# Patient Record
Sex: FEMALE | Race: BLACK OR AFRICAN AMERICAN | NOT HISPANIC OR LATINO | Employment: UNEMPLOYED | URBAN - METROPOLITAN AREA
[De-identification: names, ages, dates, MRNs, and addresses within clinical notes are randomized per-mention and may not be internally consistent; named-entity substitution may affect disease eponyms.]

---

## 2023-04-27 ENCOUNTER — APPOINTMENT (OUTPATIENT)
Dept: PRIMARY CARE | Facility: CLINIC | Age: 53
End: 2023-04-27
Payer: COMMERCIAL

## 2023-06-14 ENCOUNTER — OFFICE VISIT (OUTPATIENT)
Dept: PRIMARY CARE | Facility: CLINIC | Age: 53
End: 2023-06-14
Payer: COMMERCIAL

## 2023-06-14 VITALS
WEIGHT: 233.8 LBS | TEMPERATURE: 97.1 F | HEART RATE: 78 BPM | OXYGEN SATURATION: 100 % | RESPIRATION RATE: 18 BRPM | SYSTOLIC BLOOD PRESSURE: 112 MMHG | HEIGHT: 66 IN | BODY MASS INDEX: 37.57 KG/M2 | DIASTOLIC BLOOD PRESSURE: 82 MMHG

## 2023-06-14 DIAGNOSIS — I10 HTN (HYPERTENSION), BENIGN: ICD-10-CM

## 2023-06-14 DIAGNOSIS — Z00.00 HEALTH MAINTENANCE EXAMINATION: Primary | ICD-10-CM

## 2023-06-14 DIAGNOSIS — N95.0 POST-MENOPAUSAL BLEEDING: ICD-10-CM

## 2023-06-14 DIAGNOSIS — R73.09 ABNORMAL BLOOD SUGAR: ICD-10-CM

## 2023-06-14 DIAGNOSIS — Z12.31 ENCOUNTER FOR SCREENING MAMMOGRAM FOR MALIGNANT NEOPLASM OF BREAST: ICD-10-CM

## 2023-06-14 DIAGNOSIS — Z86.010 HISTORY OF COLON POLYPS: ICD-10-CM

## 2023-06-14 PROBLEM — M51.379 DEGENERATIVE DISC DISEASE AT L5-S1 LEVEL: Status: ACTIVE | Noted: 2023-06-14

## 2023-06-14 PROBLEM — M54.12 CERVICAL RADICULOPATHY: Status: ACTIVE | Noted: 2023-06-14

## 2023-06-14 PROBLEM — M51.37 DEGENERATIVE DISC DISEASE AT L5-S1 LEVEL: Status: ACTIVE | Noted: 2023-06-14

## 2023-06-14 PROBLEM — Z86.0100 HISTORY OF COLON POLYPS: Status: ACTIVE | Noted: 2023-06-14

## 2023-06-14 PROBLEM — N95.1 HOT FLASH, MENOPAUSAL: Status: ACTIVE | Noted: 2023-06-14

## 2023-06-14 LAB
ALANINE AMINOTRANSFERASE (SGPT) (U/L) IN SER/PLAS: 12 U/L (ref 7–45)
ALBUMIN (G/DL) IN SER/PLAS: 4.3 G/DL (ref 3.4–5)
ALKALINE PHOSPHATASE (U/L) IN SER/PLAS: 103 U/L (ref 33–110)
ANION GAP IN SER/PLAS: 12 MMOL/L (ref 10–20)
ASPARTATE AMINOTRANSFERASE (SGOT) (U/L) IN SER/PLAS: 16 U/L (ref 9–39)
BILIRUBIN TOTAL (MG/DL) IN SER/PLAS: 1.2 MG/DL (ref 0–1.2)
CALCIUM (MG/DL) IN SER/PLAS: 10.1 MG/DL (ref 8.6–10.6)
CARBON DIOXIDE, TOTAL (MMOL/L) IN SER/PLAS: 32 MMOL/L (ref 21–32)
CHLORIDE (MMOL/L) IN SER/PLAS: 100 MMOL/L (ref 98–107)
CHOLESTEROL (MG/DL) IN SER/PLAS: 211 MG/DL (ref 0–199)
CHOLESTEROL IN HDL (MG/DL) IN SER/PLAS: 59.9 MG/DL
CHOLESTEROL/HDL RATIO: 3.5
CREATININE (MG/DL) IN SER/PLAS: 0.87 MG/DL (ref 0.5–1.05)
ERYTHROCYTE DISTRIBUTION WIDTH (RATIO) BY AUTOMATED COUNT: 15.1 % (ref 11.5–14.5)
ERYTHROCYTE MEAN CORPUSCULAR HEMOGLOBIN CONCENTRATION (G/DL) BY AUTOMATED: 30.3 G/DL (ref 32–36)
ERYTHROCYTE MEAN CORPUSCULAR VOLUME (FL) BY AUTOMATED COUNT: 91 FL (ref 80–100)
ERYTHROCYTES (10*6/UL) IN BLOOD BY AUTOMATED COUNT: 4.45 X10E12/L (ref 4–5.2)
GFR FEMALE: 80 ML/MIN/1.73M2
GLUCOSE (MG/DL) IN SER/PLAS: 78 MG/DL (ref 74–99)
HEMATOCRIT (%) IN BLOOD BY AUTOMATED COUNT: 40.6 % (ref 36–46)
HEMOGLOBIN (G/DL) IN BLOOD: 12.3 G/DL (ref 12–16)
LDL: 137 MG/DL (ref 0–99)
LEUKOCYTES (10*3/UL) IN BLOOD BY AUTOMATED COUNT: 5.3 X10E9/L (ref 4.4–11.3)
NRBC (PER 100 WBCS) BY AUTOMATED COUNT: 0 /100 WBC (ref 0–0)
PLATELETS (10*3/UL) IN BLOOD AUTOMATED COUNT: 302 X10E9/L (ref 150–450)
POTASSIUM (MMOL/L) IN SER/PLAS: 4.7 MMOL/L (ref 3.5–5.3)
PROTEIN TOTAL: 7.3 G/DL (ref 6.4–8.2)
SODIUM (MMOL/L) IN SER/PLAS: 139 MMOL/L (ref 136–145)
THYROTROPIN (MIU/L) IN SER/PLAS BY DETECTION LIMIT <= 0.05 MIU/L: 1.01 MIU/L (ref 0.44–3.98)
TRIGLYCERIDE (MG/DL) IN SER/PLAS: 69 MG/DL (ref 0–149)
UREA NITROGEN (MG/DL) IN SER/PLAS: 19 MG/DL (ref 6–23)
VLDL: 14 MG/DL (ref 0–40)

## 2023-06-14 PROCEDURE — 80053 COMPREHEN METABOLIC PANEL: CPT

## 2023-06-14 PROCEDURE — 3074F SYST BP LT 130 MM HG: CPT | Performed by: FAMILY MEDICINE

## 2023-06-14 PROCEDURE — 80061 LIPID PANEL: CPT

## 2023-06-14 PROCEDURE — 3079F DIAST BP 80-89 MM HG: CPT | Performed by: FAMILY MEDICINE

## 2023-06-14 PROCEDURE — 83036 HEMOGLOBIN GLYCOSYLATED A1C: CPT

## 2023-06-14 PROCEDURE — 85027 COMPLETE CBC AUTOMATED: CPT

## 2023-06-14 PROCEDURE — 84443 ASSAY THYROID STIM HORMONE: CPT

## 2023-06-14 PROCEDURE — 99396 PREV VISIT EST AGE 40-64: CPT | Performed by: FAMILY MEDICINE

## 2023-06-14 PROCEDURE — 1036F TOBACCO NON-USER: CPT | Performed by: FAMILY MEDICINE

## 2023-06-14 RX ORDER — SEMAGLUTIDE 1 MG/.5ML
INJECTION, SOLUTION SUBCUTANEOUS
COMMUNITY
Start: 2023-05-22

## 2023-06-14 RX ORDER — GABAPENTIN 300 MG/1
1 CAPSULE ORAL 3 TIMES DAILY
COMMUNITY
Start: 2023-05-09 | End: 2023-12-04

## 2023-06-14 RX ORDER — IBUPROFEN 800 MG/1
1 TABLET ORAL EVERY 8 HOURS PRN
COMMUNITY
Start: 2021-07-13 | End: 2023-09-01 | Stop reason: ALTCHOICE

## 2023-06-14 RX ORDER — OXYCODONE AND ACETAMINOPHEN 5; 325 MG/1; MG/1
1 TABLET ORAL EVERY 8 HOURS PRN
COMMUNITY
Start: 2022-12-19 | End: 2023-09-01 | Stop reason: ALTCHOICE

## 2023-06-14 RX ORDER — TRIAMCINOLONE ACETONIDE 0.25 MG/G
OINTMENT TOPICAL
COMMUNITY
Start: 2020-05-07

## 2023-06-14 RX ORDER — AMLODIPINE BESYLATE 5 MG/1
1 TABLET ORAL DAILY
COMMUNITY
Start: 2018-04-23 | End: 2023-06-14 | Stop reason: SDUPTHER

## 2023-06-14 RX ORDER — METHYLPREDNISOLONE 4 MG/1
TABLET ORAL
COMMUNITY
Start: 2023-02-08 | End: 2023-09-01 | Stop reason: ALTCHOICE

## 2023-06-14 RX ORDER — FLUOCINOLONE ACETONIDE 0.25 MG/G
OINTMENT TOPICAL 2 TIMES DAILY
COMMUNITY
Start: 2019-04-16

## 2023-06-14 RX ORDER — CYCLOBENZAPRINE HCL 10 MG
10 TABLET ORAL AS NEEDED
COMMUNITY

## 2023-06-14 RX ORDER — BIOTIN 5 MG
1 TABLET ORAL DAILY
COMMUNITY
Start: 2022-05-18

## 2023-06-14 RX ORDER — CELECOXIB 100 MG/1
CAPSULE ORAL
COMMUNITY
Start: 2014-01-24

## 2023-06-14 RX ORDER — CICLOPIROX OLAMINE 7.7 MG/100ML
SUSPENSION TOPICAL
COMMUNITY
Start: 2021-06-03

## 2023-06-14 RX ORDER — CLOBETASOL PROPIONATE 0.5 MG/G
OINTMENT TOPICAL
COMMUNITY
Start: 2022-01-04

## 2023-06-14 RX ORDER — AMLODIPINE BESYLATE 5 MG/1
5 TABLET ORAL DAILY
Qty: 90 TABLET | Refills: 3 | Status: SHIPPED | OUTPATIENT
Start: 2023-06-14

## 2023-06-14 RX ORDER — HYDROQUINONE 40 MG/G
CREAM TOPICAL
COMMUNITY
Start: 2021-06-03 | End: 2023-09-01 | Stop reason: ALTCHOICE

## 2023-06-14 RX ORDER — MULTIVITAMIN
TABLET ORAL
COMMUNITY
Start: 2022-05-18

## 2023-06-14 RX ORDER — METFORMIN HYDROCHLORIDE 500 MG/1
TABLET ORAL
COMMUNITY
Start: 2023-05-14

## 2023-06-14 RX ORDER — MECLIZINE HYDROCHLORIDE 25 MG/1
TABLET ORAL
COMMUNITY
Start: 2022-05-18

## 2023-06-14 NOTE — PROGRESS NOTES
Reason for Visit: Annual Physical Exam    HPI:  Presents for CPE.    Weight gain: seeing weight loss specialist online, on [employer], doing well, no adverse effects    GYN: Saw Dr. Ash 10/2022, rare ASCUS with neg HPV, does report she has not had a cycle in the last 2 years but every couple of months when she wipes she will have some blood, no pelvic pain or discomfort, occurs fairly regularly    Colonoscopy scheduled for August for previous abnormal    HTN: taking meds as prescribed, no issues or problems    Active Problem List  Patient Active Problem List   Diagnosis    Cervical radiculopathy    Degenerative disc disease at L5-S1 level    History of colon polyps    Hot flash, menopausal    Health maintenance examination    HTN (hypertension), benign    Post-menopausal bleeding       Comprehensive Medical/Surgical/Social/Family History  Past Medical History:   Diagnosis Date    Anesthesia of skin 03/10/2017    Numbness of lip    Body mass index (BMI)40.0-44.9, adult (CMS/[facility]) 05/26/2021    BMI 40.0-44.9, adult    Elevated blood-pressure reading, without diagnosis of hypertension 03/10/2017    Elevated BP without diagnosis of hypertension    Encounter for gynecological examination (general) (routine) without abnormal findings 08/29/2017    Well woman exam    Encounter for immunization 04/23/2018    Need for Tdap vaccination    Encounter for other screening for malignant neoplasm of breast 06/15/2018    Screening for breast cancer    Encounter for screening for infections with a predominantly sexual mode of transmission 06/30/2016    Screening for STD (sexually transmitted disease)    Encounter for screening for malignant neoplasm of cervix 08/29/2017    Screening for cervical cancer    Localized enlarged lymph nodes 04/30/2015    Enlarged lymph nodes in armpit    Localized enlarged lymph nodes 08/22/2014    Cervical adenopathy    Morbid (severe) obesity due to excess calories (CMS/HCC) 06/07/2022    Class 2 severe  obesity with serious comorbidity and body mass index (BMI) of 39.0 to 39.9 in adult    Other specified health status 2014    Contraception    Personal history of other diseases of the musculoskeletal system and connective tissue 2014    History of low back pain    Personal history of other specified conditions 2019    History of palpitations    Personal history of other specified conditions 2019    History of paresthesia    Personal history of other specified conditions 2014    History of headache     Past Surgical History:   Procedure Laterality Date     SECTION, CLASSIC  2014     Section     Social History     Tobacco Use    Smoking status: Never    Smokeless tobacco: Never   Substance Use Topics    Alcohol use: Never    Drug use: Never     No family history on file.      Allergies and Medications  Hydrocodone-acetaminophen and Hydrocodone-guaifenesin  Current Outpatient Medications on File Prior to Visit   Medication Sig Dispense Refill    biotin 5 mg tablet Take 1 tablet (5 mg) by mouth once daily.      CeleBREX 100 mg capsule Take by mouth.      ciclopirox (Loprox) 0.77 % suspension APPLY TO AFFECTED AREAS ON SCALP TWICE DAILY AS NEEDED. MAY DECREASE TO 3 TIMES A WEEK WHEN CLEAR.      clobetasol (Temovate) 0.05 % ointment APPLY TOPICALLY TO AFFECTED AREAS ON SCALP  BODY TWICE A DAY AS NEEDED  TWO WEEKS ON / ONE WEEK OFF. STOP WHEN CLEAR. AVOID FACE  AXILLAE  G      fluocinolone (Synalar) 0.025 % ointment Apply topically 2 times a day.      gabapentin (Neurontin) 300 mg capsule Take 1 capsule (300 mg) by mouth 3 times a day.      hydroquinone 4 % cream APPLY TO HYPERPIGMENTED AREAS TWICE A DAY UP TO 3 MONTHS MAX      ibuprofen 800 mg tablet Take 1 tablet (800 mg) by mouth every 8 hours if needed.      meclizine (Antivert) 25 mg tablet Take by mouth.      metFORMIN (Glucophage) 500 mg tablet       methylPREDNISolone (Medrol Dospak) 4 mg tablets Take by mouth.    "   multivitamin tablet Take by mouth.      oxyCODONE-acetaminophen (Percocet) 5-325 mg tablet Take 1 tablet by mouth every 8 hours if needed.      triamcinolone (Kenalog) 0.025 % ointment Triamcinolone Acetonide 0.025 % External Ointment  Refills: 0  Start: 7-May-2020      Wegovy 1 mg/0.5 mL pen injector PLEASE SEE ATTACHED FOR DETAILED DIRECTIONS      [DISCONTINUED] amLODIPine (Norvasc) 5 mg tablet Take 1 tablet (5 mg) by mouth once daily.      cyclobenzaprine (Flexeril) 10 mg tablet Take 1 tablet (10 mg) by mouth 3 times a day.       No current facility-administered medications on file prior to visit.         ROS otherwise negative aside from what was mentioned above in HPI.    Vitals  /82 (BP Location: Right arm, Patient Position: Sitting)   Pulse 78   Temp 36.2 °C (97.1 °F) (Temporal)   Resp 18   Ht 1.676 m (5' 5.98\")   Wt 106 kg (233 lb 12.8 oz)   SpO2 100%   BMI 37.75 kg/m²   Body mass index is 37.75 kg/m².  Physical Exam  Gen: Alert, NAD  HEENT:  PERRL, EOMI, conjunctiva and sclera normal in appearance. External auditory canals/TMs normal; Oral cavity and posterior pharynx without lesions/exudate  Neck:  Supple with FROM; No masses/nodes palpable; Thyroid nontender and without nodules; No CE  Respiratory:  Lungs CTAB  Cardiovascular:  Heart RRR. No M/R/G. Peripheral pulses equal bilaterally  Abdomen:  Soft, nontender, BS present throughout; No R/G/R; No HSM or masses palpated  Extremities:  FROM all extremities; Muscle strength grossly normal with good tone  Neuro:  CN II-XII intact; Reflexes 2+/2+; Gross motor and sensory intact  Skin:  No suspicious lesions present    Assessment and Plan:  Problem List Items Addressed This Visit       History of colon polyps    Current Assessment & Plan     Colonoscopy follow up scheduled         Health maintenance examination - Primary    Current Assessment & Plan     Recommended screening guidelines addressed and orders placed as indicated by age and chronic " conditions  Screening labs ordered, will call with results  PAP, breast with GYN  Colonoscopy scheduled  Continue to work on healthy lifestyle including well balanced diet, regular activity, limit alcohol, no tobacco products and safe sexual practices  Follow up annually           Relevant Orders    CBC    Comprehensive Metabolic Panel    Lipid Panel    HTN (hypertension), benign    Current Assessment & Plan     Well controlled  Continue current regimen         Relevant Medications    amLODIPine (Norvasc) 5 mg tablet    Post-menopausal bleeding    Current Assessment & Plan     Discussed importance of follow up  Will check labs including TSH  Recommend pelvic ultrasound to evaluate uterine lining  Follow up with GYN for possible endometrial biopsy along with follow up PAP smear         Relevant Orders    CBC    TSH with reflex to Free T4 if abnormal    Pelvic Ultrasound     Other Visit Diagnoses       Encounter for screening mammogram for malignant neoplasm of breast        Relevant Orders    BI mammo bilateral screening tomosynthesis    Abnormal blood sugar        Relevant Orders    Hemoglobin A1C              Sue Moyer MD

## 2023-06-14 NOTE — ASSESSMENT & PLAN NOTE
Recommended screening guidelines addressed and orders placed as indicated by age and chronic conditions  Screening labs ordered, will call with results  PAP, breast with GYN  Colonoscopy scheduled  Continue to work on healthy lifestyle including well balanced diet, regular activity, limit alcohol, no tobacco products and safe sexual practices  Follow up annually

## 2023-06-14 NOTE — ASSESSMENT & PLAN NOTE
Discussed importance of follow up  Will check labs including TSH  Recommend pelvic ultrasound to evaluate uterine lining  Follow up with GYN for possible endometrial biopsy along with follow up PAP smear

## 2023-06-15 LAB
ESTIMATED AVERAGE GLUCOSE FOR HBA1C: 111 MG/DL
HEMOGLOBIN A1C/HEMOGLOBIN TOTAL IN BLOOD: 5.5 %

## 2023-07-28 ENCOUNTER — TELEPHONE (OUTPATIENT)
Dept: PRIMARY CARE | Facility: CLINIC | Age: 53
End: 2023-07-28
Payer: COMMERCIAL

## 2023-08-01 DIAGNOSIS — R94.8 ABNORMAL PET SCAN OF RETROPERITONEUM: Primary | ICD-10-CM

## 2023-08-01 NOTE — PROGRESS NOTES
Called pt to discuss results of PET scan ordered by PM&R physician.  Shows evidence of increased hypermetabolic activity in lymph nodes of right retroperitoneal nodes.  Pt did discuss some results with ordering physician but was not aware of possible underlying cause which appears suspicious for lymphomatous process.  All questions answered to best of my ability.  Of note, pt did have recent endometrial biopsy done with Dr. Ash, results still pending.  She is feeling well otherwise.  Discussed that we will have appt with surgical oncology set up ASAP to get her in and get answers.

## 2023-08-14 ENCOUNTER — HOSPITAL ENCOUNTER (OUTPATIENT)
Dept: DATA CONVERSION | Facility: HOSPITAL | Age: 53
End: 2023-08-14
Attending: STUDENT IN AN ORGANIZED HEALTH CARE EDUCATION/TRAINING PROGRAM | Admitting: STUDENT IN AN ORGANIZED HEALTH CARE EDUCATION/TRAINING PROGRAM
Payer: COMMERCIAL

## 2023-08-14 DIAGNOSIS — R59.0 LOCALIZED ENLARGED LYMPH NODES: ICD-10-CM

## 2023-08-14 DIAGNOSIS — C81.95: ICD-10-CM

## 2023-08-14 LAB
ATRIAL RATE: 71 BPM
P AXIS: 39 DEGREES
P OFFSET: 194 MS
P ONSET: 136 MS
POCT GLUCOSE: 91 MG/DL (ref 74–99)
PR INTERVAL: 170 MS
Q ONSET: 221 MS
QRS COUNT: 12 BEATS
QRS DURATION: 86 MS
QT INTERVAL: 418 MS
QTC CALCULATION(BAZETT): 454 MS
QTC FREDERICIA: 442 MS
R AXIS: -4 DEGREES
T AXIS: 27 DEGREES
T OFFSET: 430 MS
VENTRICULAR RATE: 71 BPM

## 2023-08-15 ENCOUNTER — APPOINTMENT (OUTPATIENT)
Dept: PRIMARY CARE | Facility: CLINIC | Age: 53
End: 2023-08-15
Payer: COMMERCIAL

## 2023-08-18 LAB
CCOLL: NORMAL PER TUBE
CCOUN: 106.4 X10E9/L
COMPLETE PATHOLOGY REPORT: NORMAL
CONVERTED CLINICAL DIAGNOSIS-HISTORY: NORMAL
CONVERTED FINAL DIAGNOSIS: NORMAL
CONVERTED FINAL REPORT PDF LINK TO COPY AND PASTE: NORMAL
CONVERTED GROSS DESCRIPTION: NORMAL
FCTOR: NORMAL
FCTSO: NORMAL
FSITE: NORMAL
LANTB: NORMAL
LCD19: 21 % OF LYMPH
LCD4: 67 % OF LYMPH
LCD8: 8 % OF LYMPH
LGPD1: NORMAL
LGPNO: NORMAL
LNK: 1 % OF LYMPH
LPERC: 92 %
PV194: NORMAL
VIAB: NORMAL

## 2023-09-01 ENCOUNTER — OFFICE VISIT (OUTPATIENT)
Dept: PRIMARY CARE | Facility: CLINIC | Age: 53
End: 2023-09-01
Payer: COMMERCIAL

## 2023-09-01 VITALS
WEIGHT: 220.6 LBS | OXYGEN SATURATION: 98 % | DIASTOLIC BLOOD PRESSURE: 86 MMHG | HEART RATE: 91 BPM | TEMPERATURE: 98.7 F | BODY MASS INDEX: 35.62 KG/M2 | SYSTOLIC BLOOD PRESSURE: 134 MMHG

## 2023-09-01 DIAGNOSIS — F41.9 ANXIETY: Primary | ICD-10-CM

## 2023-09-01 PROCEDURE — 1036F TOBACCO NON-USER: CPT | Performed by: FAMILY MEDICINE

## 2023-09-01 PROCEDURE — 3079F DIAST BP 80-89 MM HG: CPT | Performed by: FAMILY MEDICINE

## 2023-09-01 PROCEDURE — 3075F SYST BP GE 130 - 139MM HG: CPT | Performed by: FAMILY MEDICINE

## 2023-09-01 PROCEDURE — 99213 OFFICE O/P EST LOW 20 MIN: CPT | Performed by: FAMILY MEDICINE

## 2023-09-01 RX ORDER — SERTRALINE HYDROCHLORIDE 50 MG/1
50 TABLET, FILM COATED ORAL DAILY
Qty: 30 TABLET | Refills: 5 | Status: SHIPPED | OUTPATIENT
Start: 2023-09-01 | End: 2024-02-28

## 2023-09-01 RX ORDER — ALPRAZOLAM 0.25 MG/1
0.25 TABLET ORAL 2 TIMES DAILY PRN
Qty: 30 TABLET | Refills: 0 | Status: SHIPPED | OUTPATIENT
Start: 2023-09-01 | End: 2023-09-16

## 2023-09-01 ASSESSMENT — PATIENT HEALTH QUESTIONNAIRE - PHQ9
SUM OF ALL RESPONSES TO PHQ9 QUESTIONS 1 AND 2: 3
1. LITTLE INTEREST OR PLEASURE IN DOING THINGS: NOT AT ALL
2. FEELING DOWN, DEPRESSED OR HOPELESS: NEARLY EVERY DAY

## 2023-09-01 NOTE — PROGRESS NOTES
Subjective   Patient ID: Alejandra Jimenes is a 53 y.o. female who presents for Anxiety (Wants to get on medication).    HPI   Presents for follow up from recent imaging as well as recent diagnosis of lymphoma.  She is following with [facility].  Will be starting chemotherapy later this month.  Presents today because she is struggling with recent diagnosis.  Has trouble sleeping, lots of anxiety.  Has a lot of support.  Seeing therapist.  Would like something to help with sleep and overall mood.    Review of Systems  Negative unless noted in HPI    Objective   /86 (BP Location: Left arm, Patient Position: Sitting) Comment: electronic reading  Pulse 91   Temp 37.1 °C (98.7 °F) (Oral)   Wt 100 kg (220 lb 9.6 oz)   SpO2 98%   BMI 35.62 kg/m²     Physical Exam  Constitutional:       Appearance: Normal appearance.   Neurological:      General: No focal deficit present.      Mental Status: She is alert.   Psychiatric:         Mood and Affect: Mood normal.      Comments: Appropriately tearful         Assessment/Plan   Problem List Items Addressed This Visit       Anxiety - Primary     Significant recent stressors  We discussed coping strategies as well as support symptoms at length  Has strong support, sees therapy already   Will use limited short term rx of Xanax, discussed medication and its use at length  Will also add Zoloft for daily use  Continue to reevaluate psychologic needs throughout the treatment process         Relevant Medications    sertraline (Zoloft) 50 mg tablet    ALPRAZolam (Xanax) 0.25 mg tablet

## 2023-09-02 PROBLEM — Z00.00 HEALTH MAINTENANCE EXAMINATION: Status: RESOLVED | Noted: 2023-06-14 | Resolved: 2023-09-02

## 2023-09-02 PROBLEM — F41.9 ANXIETY: Status: ACTIVE | Noted: 2023-09-02

## 2023-09-02 NOTE — ASSESSMENT & PLAN NOTE
Significant recent stressors  We discussed coping strategies as well as support symptoms at length  Has strong support, sees therapy already   Will use limited short term rx of Xanax, discussed medication and its use at length  Will also add Zoloft for daily use  Continue to reevaluate psychologic needs throughout the treatment process

## 2023-09-27 PROBLEM — C81.00: Status: ACTIVE | Noted: 2023-09-27

## 2023-09-30 DIAGNOSIS — C81.00 NODULAR LYMPHOCYTE PREDOMINANT HODGKIN LYMPHOMA, UNSPECIFIED BODY REGION (MULTI): Primary | ICD-10-CM

## 2023-09-30 RX ORDER — PROCHLORPERAZINE MALEATE 10 MG
10 TABLET ORAL EVERY 6 HOURS PRN
Qty: 30 TABLET | Refills: 3 | Status: SHIPPED | OUTPATIENT
Start: 2023-09-30 | End: 2023-11-29

## 2023-09-30 NOTE — H&P
History & Physical Reviewed:   Pregnant/Lactating:  ·  Are You Pregnant no   ·  Are You Currently Breastfeeding no     I have reviewed the History and Physical dated:  14-Aug-2023   History and Physical reviewed and relevant findings noted. Patient examined to review pertinent physical  findings.: No significant changes   Home Medications Reviewed: no changes noted   Allergies Reviewed: no changes noted       ERAS (Enhanced Recovery After Surgery):  ·  ERAS Patient: no     Consent:   COVID-19 Consent:  ·  COVID-19 Risk Consent Surgeon has reviewed key risks related to the risk of jose luis COVID-19 and if they contract COVID-19 what the risks are.       Electronic Signatures:  Ron Wild)  (Signed 14-Aug-2023 07:50)   Authored: History & Physical Reviewed, ERAS, Consent,  Note Completion      Last Updated: 14-Aug-2023 07:50 by Ron Wild)

## 2023-10-01 NOTE — OP NOTE
PROCEDURE DETAILS    Preoperative Diagnosis:  Right inguinal lymphadenopathy  Postoperative Diagnosis:  Right inguinal lymphadenopathy  Surgeon: Ron Wild  Resident/Fellow/Other Assistant: Henry Dean    Procedure:  1. RIGHT INGUINAL LYMPH NODE EXCISION     Anesthesia: No anesthesiologist associated with this case  Estimated Blood Loss: 5  Findings: Soft enlarged right inguinal lymph nodes  Specimens(s) Collected: yes,  Right inguinal node-half for permanent half for fresh lymphoma protocol         Operative Report:   Indications for surgery: The patient was noted to have right inguinal lymphadenopathy concerning for lymphoproliferative disorder.  After discussing the risk  and benefits of excisional lymph node biopsy for diagnosis she elected to proceed.    Details of procedure: The patient arrived [facility] on 8/14/2023 for the aforementioned procedure.  History of physical was performed,  consent was signed, questions answered, she was moved to the operating room.  A timeout was performed and she was induced under general anesthesia.  Her right groin was prepped and draped in the usual sterile fashion and a 4 cm incision was made over  the palpable lymph node.  This was circumferentially dissected free with clips and cautery.  This was cut in half and sent for permanent pathology as well as fresh lymphoma protocol.  The wound was irrigated for hemostasis which was adequate.  Roxana  was injected into the wound which was then closed using a figure-of-eight 2-0 Vicryl for the deep layer, interrupted deep dermal 3-0 Vicryl's, and a running subcuticular 4-0 Monocryl for the skin.  The wound was dressed with Dermabond.  The patient was  awoken having Toller procedure well be discharged home.                        Attestation:   Note Completion:  Attending Attestation I performed the procedure without a resident         Electronic Signatures:  Ron Wild)  (Signed 14-Aug-2023  08:49)   Authored: Post-Operative Note, Chart Review, Note Completion      Last Updated: 14-Aug-2023 08:49 by Ron Wild)

## 2023-10-06 DIAGNOSIS — C81.00 NODULAR LYMPHOCYTE PREDOMINANT HODGKIN LYMPHOMA, UNSPECIFIED BODY REGION (MULTI): Primary | ICD-10-CM

## 2023-10-06 RX ORDER — HEPARIN 100 UNIT/ML
500 SYRINGE INTRAVENOUS AS NEEDED
Status: CANCELLED | OUTPATIENT
Start: 2023-10-10

## 2023-10-06 RX ORDER — HEPARIN SODIUM,PORCINE/PF 10 UNIT/ML
50 SYRINGE (ML) INTRAVENOUS AS NEEDED
Status: CANCELLED | OUTPATIENT
Start: 2023-10-10

## 2023-10-06 RX ORDER — DOXORUBICIN HYDROCHLORIDE 2 MG/ML
50 INJECTION, SOLUTION INTRAVENOUS ONCE
Status: CANCELLED | OUTPATIENT
Start: 2023-10-10

## 2023-10-06 RX ORDER — PROCHLORPERAZINE MALEATE 10 MG
10 TABLET ORAL EVERY 6 HOURS PRN
Status: CANCELLED | OUTPATIENT
Start: 2023-10-10

## 2023-10-06 RX ORDER — DIPHENHYDRAMINE HYDROCHLORIDE 50 MG/ML
50 INJECTION INTRAMUSCULAR; INTRAVENOUS AS NEEDED
Status: CANCELLED | OUTPATIENT
Start: 2023-10-10

## 2023-10-06 RX ORDER — EPINEPHRINE 0.3 MG/.3ML
0.3 INJECTION SUBCUTANEOUS EVERY 5 MIN PRN
Status: CANCELLED | OUTPATIENT
Start: 2023-10-10

## 2023-10-06 RX ORDER — PROCHLORPERAZINE EDISYLATE 5 MG/ML
10 INJECTION INTRAMUSCULAR; INTRAVENOUS EVERY 6 HOURS PRN
Status: CANCELLED | OUTPATIENT
Start: 2023-10-10

## 2023-10-06 RX ORDER — DIPHENHYDRAMINE HCL 50 MG
50 CAPSULE ORAL ONCE
Status: CANCELLED | OUTPATIENT
Start: 2023-10-10

## 2023-10-06 RX ORDER — ACETAMINOPHEN 325 MG/1
650 TABLET ORAL ONCE
Status: CANCELLED | OUTPATIENT
Start: 2023-10-10

## 2023-10-06 RX ORDER — FAMOTIDINE 10 MG/ML
20 INJECTION INTRAVENOUS ONCE AS NEEDED
Status: CANCELLED | OUTPATIENT
Start: 2023-10-10

## 2023-10-06 RX ORDER — PALONOSETRON 0.05 MG/ML
0.25 INJECTION, SOLUTION INTRAVENOUS ONCE
Status: CANCELLED | OUTPATIENT
Start: 2023-10-10

## 2023-10-06 RX ORDER — ALBUTEROL SULFATE 0.83 MG/ML
3 SOLUTION RESPIRATORY (INHALATION) AS NEEDED
Status: CANCELLED | OUTPATIENT
Start: 2023-10-10

## 2023-10-07 PROBLEM — K21.9 ACID REFLUX: Status: ACTIVE | Noted: 2023-10-07

## 2023-10-07 PROBLEM — F07.81 POST-CONCUSSION SYNDROME: Status: ACTIVE | Noted: 2023-10-07
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
## 2023-10-07 PROBLEM — R59.0 LYMPHADENOPATHY, INGUINAL: Status: ACTIVE | Noted: 2023-10-07

## 2023-10-07 PROBLEM — R32 URINE INCONTINENCE: Status: ACTIVE | Noted: 2023-10-07

## 2023-10-07 PROBLEM — R59.0 RETROPERITONEAL LYMPHADENOPATHY: Status: ACTIVE | Noted: 2023-10-07

## 2023-10-07 PROBLEM — R63.5 WEIGHT GAIN: Status: ACTIVE | Noted: 2023-10-07

## 2023-10-07 PROBLEM — F41.8 DEPRESSION WITH ANXIETY: Status: ACTIVE | Noted: 2023-10-07
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
## 2023-10-07 PROBLEM — R59.0 CERVICAL ADENOPATHY: Status: ACTIVE | Noted: 2023-10-07

## 2023-10-07 PROBLEM — R93.89 ABNORMAL ULTRASOUND: Status: ACTIVE | Noted: 2023-10-07

## 2023-10-07 PROBLEM — M54.16 LUMBAR RADICULITIS: Status: ACTIVE | Noted: 2023-10-07

## 2023-10-07 PROBLEM — M54.40 LOW BACK PAIN WITH SCIATICA: Status: ACTIVE | Noted: 2023-10-07

## 2023-10-07 PROBLEM — R63.2 POLYPHAGIA: Status: ACTIVE | Noted: 2023-10-07

## 2023-10-07 PROBLEM — G44.309 POST-TRAUMATIC HEADACHE, NOT INTRACTABLE: Status: ACTIVE | Noted: 2023-10-07

## 2023-10-07 PROBLEM — R55 SYNCOPE: Status: ACTIVE | Noted: 2023-10-07

## 2023-10-07 PROBLEM — E88.810 ABDOMINAL OBESITY AND METABOLIC SYNDROME: Status: ACTIVE | Noted: 2023-10-07

## 2023-10-07 PROBLEM — R42 VERTIGO: Status: ACTIVE | Noted: 2023-10-07

## 2023-10-07 PROBLEM — E66.9 ABDOMINAL OBESITY AND METABOLIC SYNDROME: Status: ACTIVE | Noted: 2023-10-07

## 2023-10-07 PROBLEM — G56.03 CARPAL TUNNEL SYNDROME, BILATERAL: Status: ACTIVE | Noted: 2019-02-13

## 2023-10-07 PROBLEM — M47.816 LUMBAR SPONDYLOSIS: Status: ACTIVE | Noted: 2023-10-07

## 2023-10-07 RX ORDER — NYSTATIN 100000 [USP'U]/ML
4 SUSPENSION ORAL 4 TIMES DAILY
COMMUNITY
Start: 2023-09-25

## 2023-10-07 RX ORDER — ONDANSETRON HYDROCHLORIDE 8 MG/1
8 TABLET, FILM COATED ORAL 3 TIMES DAILY PRN
COMMUNITY
Start: 2023-09-26 | End: 2023-11-10 | Stop reason: SDUPTHER

## 2023-10-07 RX ORDER — PREDNISONE 50 MG/1
100 TABLET ORAL DAILY
COMMUNITY
Start: 2023-09-23

## 2023-10-07 RX ORDER — SEMAGLUTIDE 1.7 MG/.75ML
1.7 INJECTION, SOLUTION SUBCUTANEOUS
COMMUNITY
Start: 2023-09-20

## 2023-10-07 RX ORDER — POTASSIUM CHLORIDE 750 MG/1
10 TABLET, EXTENDED RELEASE ORAL DAILY
COMMUNITY
Start: 2023-09-26

## 2023-10-10 ENCOUNTER — INFUSION (OUTPATIENT)
Dept: HEMATOLOGY/ONCOLOGY | Facility: CLINIC | Age: 53
End: 2023-10-10
Payer: COMMERCIAL

## 2023-10-10 VITALS
HEART RATE: 94 BPM | WEIGHT: 210 LBS | TEMPERATURE: 97.2 F | OXYGEN SATURATION: 99 % | SYSTOLIC BLOOD PRESSURE: 126 MMHG | BODY MASS INDEX: 34.99 KG/M2 | HEIGHT: 65 IN | RESPIRATION RATE: 18 BRPM | DIASTOLIC BLOOD PRESSURE: 81 MMHG

## 2023-10-10 DIAGNOSIS — C81.00 NODULAR LYMPHOCYTE PREDOMINANT HODGKIN LYMPHOMA, UNSPECIFIED BODY REGION (MULTI): ICD-10-CM

## 2023-10-10 LAB
ALBUMIN SERPL BCP-MCNC: 4.2 G/DL (ref 3.4–5)
ALP SERPL-CCNC: 76 U/L (ref 33–110)
ALT SERPL W P-5'-P-CCNC: 18 U/L (ref 7–45)
ANION GAP SERPL CALC-SCNC: 14 MMOL/L (ref 10–20)
AST SERPL W P-5'-P-CCNC: 26 U/L (ref 9–39)
BASOPHILS # BLD AUTO: 0 X10*3/UL (ref 0–0.1)
BASOPHILS NFR BLD AUTO: 0 %
BILIRUB SERPL-MCNC: 0.5 MG/DL (ref 0–1.2)
BUN SERPL-MCNC: 16 MG/DL (ref 6–23)
CALCIUM SERPL-MCNC: 9.7 MG/DL (ref 8.6–10.3)
CHLORIDE SERPL-SCNC: 104 MMOL/L (ref 98–107)
CO2 SERPL-SCNC: 25 MMOL/L (ref 21–32)
CREAT SERPL-MCNC: 0.84 MG/DL (ref 0.5–1.05)
EOSINOPHIL # BLD AUTO: 0.01 X10*3/UL (ref 0–0.7)
EOSINOPHIL NFR BLD AUTO: 0.2 %
ERYTHROCYTE [DISTWIDTH] IN BLOOD BY AUTOMATED COUNT: 15.4 % (ref 11.5–14.5)
GFR SERPL CREATININE-BSD FRML MDRD: 83 ML/MIN/1.73M*2
GLUCOSE SERPL-MCNC: 95 MG/DL (ref 74–99)
HCT VFR BLD AUTO: 33.9 % (ref 36–46)
HGB BLD-MCNC: 10.5 G/DL (ref 12–16)
IMM GRANULOCYTES # BLD AUTO: 0.01 X10*3/UL (ref 0–0.7)
IMM GRANULOCYTES NFR BLD AUTO: 0.2 % (ref 0–0.9)
LDH SERPL L TO P-CCNC: 269 U/L (ref 84–246)
LYMPHOCYTES # BLD AUTO: 1.34 X10*3/UL (ref 1.2–4.8)
LYMPHOCYTES NFR BLD AUTO: 29.8 %
MCH RBC QN AUTO: 28.5 PG (ref 26–34)
MCHC RBC AUTO-ENTMCNC: 31 G/DL (ref 32–36)
MCV RBC AUTO: 92 FL (ref 80–100)
MONOCYTES # BLD AUTO: 0.58 X10*3/UL (ref 0.1–1)
MONOCYTES NFR BLD AUTO: 12.9 %
NEUTROPHILS # BLD AUTO: 2.56 X10*3/UL (ref 1.2–7.7)
NEUTROPHILS NFR BLD AUTO: 56.9 %
NRBC BLD-RTO: ABNORMAL /100{WBCS}
PLATELET # BLD AUTO: 464 X10*3/UL (ref 150–450)
PMV BLD AUTO: 9.4 FL (ref 7.5–11.5)
POTASSIUM SERPL-SCNC: 3.6 MMOL/L (ref 3.5–5.3)
POTASSIUM SERPL-SCNC: 6.1 MMOL/L (ref 3.5–5.3)
PROT SERPL-MCNC: 7.4 G/DL (ref 6.4–8.2)
RBC # BLD AUTO: 3.69 X10*6/UL (ref 4–5.2)
SODIUM SERPL-SCNC: 137 MMOL/L (ref 136–145)
URATE SERPL-MCNC: 5 MG/DL (ref 2.3–6.7)
WBC # BLD AUTO: 4.5 X10*3/UL (ref 4.4–11.3)

## 2023-10-10 PROCEDURE — 96413 CHEMO IV INFUSION 1 HR: CPT

## 2023-10-10 PROCEDURE — 96367 TX/PROPH/DG ADDL SEQ IV INF: CPT

## 2023-10-10 PROCEDURE — 80053 COMPREHEN METABOLIC PANEL: CPT

## 2023-10-10 PROCEDURE — 84550 ASSAY OF BLOOD/URIC ACID: CPT

## 2023-10-10 PROCEDURE — 84132 ASSAY OF SERUM POTASSIUM: CPT | Performed by: INTERNAL MEDICINE

## 2023-10-10 PROCEDURE — 96409 CHEMO IV PUSH SNGL DRUG: CPT

## 2023-10-10 PROCEDURE — 83615 LACTATE (LD) (LDH) ENZYME: CPT

## 2023-10-10 PROCEDURE — 85025 COMPLETE CBC W/AUTO DIFF WBC: CPT

## 2023-10-10 PROCEDURE — 96415 CHEMO IV INFUSION ADDL HR: CPT

## 2023-10-10 PROCEDURE — 36415 COLL VENOUS BLD VENIPUNCTURE: CPT

## 2023-10-10 PROCEDURE — 96375 TX/PRO/DX INJ NEW DRUG ADDON: CPT

## 2023-10-10 PROCEDURE — 2500000001 HC RX 250 WO HCPCS SELF ADMINISTERED DRUGS (ALT 637 FOR MEDICARE OP): Performed by: INTERNAL MEDICINE

## 2023-10-10 PROCEDURE — 2500000004 HC RX 250 GENERAL PHARMACY W/ HCPCS (ALT 636 FOR OP/ED): Mod: JZ | Performed by: INTERNAL MEDICINE

## 2023-10-10 PROCEDURE — 96411 CHEMO IV PUSH ADDL DRUG: CPT

## 2023-10-10 PROCEDURE — 36415 COLL VENOUS BLD VENIPUNCTURE: CPT | Performed by: INTERNAL MEDICINE

## 2023-10-10 PROCEDURE — 96417 CHEMO IV INFUS EACH ADDL SEQ: CPT

## 2023-10-10 RX ORDER — DIPHENHYDRAMINE HYDROCHLORIDE 50 MG/ML
50 INJECTION INTRAMUSCULAR; INTRAVENOUS AS NEEDED
Status: DISCONTINUED | OUTPATIENT
Start: 2023-10-10 | End: 2023-10-10 | Stop reason: HOSPADM

## 2023-10-10 RX ORDER — ALBUTEROL SULFATE 0.83 MG/ML
3 SOLUTION RESPIRATORY (INHALATION) AS NEEDED
Status: DISCONTINUED | OUTPATIENT
Start: 2023-10-10 | End: 2023-10-10 | Stop reason: HOSPADM

## 2023-10-10 RX ORDER — DIPHENHYDRAMINE HCL 25 MG
50 CAPSULE ORAL ONCE
Status: COMPLETED | OUTPATIENT
Start: 2023-10-10 | End: 2023-10-10

## 2023-10-10 RX ORDER — PALONOSETRON 0.05 MG/ML
0.25 INJECTION, SOLUTION INTRAVENOUS ONCE
Status: COMPLETED | OUTPATIENT
Start: 2023-10-10 | End: 2023-10-10

## 2023-10-10 RX ORDER — FAMOTIDINE 10 MG/ML
20 INJECTION INTRAVENOUS ONCE AS NEEDED
Status: DISCONTINUED | OUTPATIENT
Start: 2023-10-10 | End: 2023-10-10 | Stop reason: HOSPADM

## 2023-10-10 RX ORDER — PROCHLORPERAZINE MALEATE 10 MG
10 TABLET ORAL EVERY 6 HOURS PRN
Status: DISCONTINUED | OUTPATIENT
Start: 2023-10-10 | End: 2023-10-10 | Stop reason: HOSPADM

## 2023-10-10 RX ORDER — EPINEPHRINE 0.3 MG/.3ML
0.3 INJECTION SUBCUTANEOUS EVERY 5 MIN PRN
Status: DISCONTINUED | OUTPATIENT
Start: 2023-10-10 | End: 2023-10-10 | Stop reason: HOSPADM

## 2023-10-10 RX ORDER — PROCHLORPERAZINE EDISYLATE 5 MG/ML
10 INJECTION INTRAMUSCULAR; INTRAVENOUS EVERY 6 HOURS PRN
Status: DISCONTINUED | OUTPATIENT
Start: 2023-10-10 | End: 2023-10-10 | Stop reason: HOSPADM

## 2023-10-10 RX ORDER — DOXORUBICIN HYDROCHLORIDE 2 MG/ML
50 INJECTION, SOLUTION INTRAVENOUS ONCE
Status: COMPLETED | OUTPATIENT
Start: 2023-10-10 | End: 2023-10-10

## 2023-10-10 RX ORDER — ACETAMINOPHEN 325 MG/1
650 TABLET ORAL ONCE
Status: COMPLETED | OUTPATIENT
Start: 2023-10-10 | End: 2023-10-10

## 2023-10-10 RX ADMIN — ACETAMINOPHEN 650 MG: 325 TABLET ORAL at 12:51

## 2023-10-10 RX ADMIN — PALONOSETRON 250 MCG: 0.05 INJECTION, SOLUTION INTRAVENOUS at 11:06

## 2023-10-10 RX ADMIN — SODIUM CHLORIDE 800 MG: 9 INJECTION, SOLUTION INTRAVENOUS at 13:32

## 2023-10-10 RX ADMIN — DIPHENHYDRAMINE HYDROCHLORIDE 50 MG: 25 CAPSULE ORAL at 12:51

## 2023-10-10 RX ADMIN — DOXORUBICIN HYDROCHLORIDE 108 MG: 2 INJECTION, SOLUTION INTRAVENOUS at 11:50

## 2023-10-10 RX ADMIN — FOSAPREPITANT 150 MG: 150 INJECTION, POWDER, LYOPHILIZED, FOR SOLUTION INTRAVENOUS at 11:06

## 2023-10-10 RX ADMIN — CYCLOPHOSPHAMIDE 1620 MG: 1 INJECTION, POWDER, FOR SOLUTION INTRAVENOUS; ORAL at 12:49

## 2023-10-10 RX ADMIN — VINCRISTINE SULFATE 2 MG: 1 INJECTION, SOLUTION INTRAVENOUS at 12:29

## 2023-10-10 ASSESSMENT — PATIENT HEALTH QUESTIONNAIRE - PHQ9
1. LITTLE INTEREST OR PLEASURE IN DOING THINGS: SEVERAL DAYS
2. FEELING DOWN, DEPRESSED OR HOPELESS: SEVERAL DAYS
SUM OF ALL RESPONSES TO PHQ9 QUESTIONS 1 AND 2: 2
10. IF YOU CHECKED OFF ANY PROBLEMS, HOW DIFFICULT HAVE THESE PROBLEMS MADE IT FOR YOU TO DO YOUR WORK, TAKE CARE OF THINGS AT HOME, OR GET ALONG WITH OTHER PEOPLE: VERY DIFFICULT

## 2023-10-10 ASSESSMENT — COLUMBIA-SUICIDE SEVERITY RATING SCALE - C-SSRS
6. HAVE YOU EVER DONE ANYTHING, STARTED TO DO ANYTHING, OR PREPARED TO DO ANYTHING TO END YOUR LIFE?: NO
2. HAVE YOU ACTUALLY HAD ANY THOUGHTS OF KILLING YOURSELF?: NO
1. IN THE PAST MONTH, HAVE YOU WISHED YOU WERE DEAD OR WISHED YOU COULD GO TO SLEEP AND NOT WAKE UP?: NO

## 2023-10-10 ASSESSMENT — ENCOUNTER SYMPTOMS
LOSS OF SENSATION IN FEET: 1
OCCASIONAL FEELINGS OF UNSTEADINESS: 1
DEPRESSION: 1

## 2023-10-11 ENCOUNTER — SOCIAL WORK (OUTPATIENT)
Dept: CASE MANAGEMENT | Facility: HOSPITAL | Age: 53
End: 2023-10-11
Payer: COMMERCIAL

## 2023-10-11 NOTE — PROGRESS NOTES
Social Work Note  10/11/2023  SW met patient and  in infusion yesterday.  SW  had forms from [employer] for patient to sign for medication assistance.  Forms emailed to Gris in financial counseling.  SW talked with them about areas this writer can assist with. SW talked in detail about [facility] and patient was provided their current brochure.  Contact information for this writer was provided to both.  They were encouraged to contact this writer for questions or concerns.  Patient and  live in [place].  She works for the [employer] as a  and is covered under [employer].  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  SW will remain available to assist patient.  Tessa Lindsey, MSW, LSW

## 2023-10-16 ENCOUNTER — SOCIAL WORK (OUTPATIENT)
Dept: CASE MANAGEMENT | Facility: HOSPITAL | Age: 53
End: 2023-10-16
Payer: COMMERCIAL

## 2023-10-16 NOTE — PROGRESS NOTES
Social Work Note  10/16/2023 SW received a call from patient for some assistance.  Patient was interested in gas cards, wig assistance and protein shakes/difficulty swallowing.  SW applied for Patient Aid through S and encouraged patient to watch this site as well as [employer] for openings.  SW talked with patient about [facility] and two locations for wig assistance.  ALAN sent an email to RD and MD's nurse regarding mouth sores and assistance/ideas for any supplements.  ALAN will remain available to assist patient.  Tessa Lindsey, MSW, LSW

## 2023-10-17 ENCOUNTER — NUTRITION (OUTPATIENT)
Dept: HEMATOLOGY/ONCOLOGY | Facility: CLINIC | Age: 53
End: 2023-10-17
Payer: COMMERCIAL

## 2023-10-17 NOTE — PROGRESS NOTES
NUTRITION COMMUNICATION NOTE    Aeljandra Jimenes     REASON FOR COMMUNICATION: Call placed to Alejandra today as I received a referral from ALAN here in [place].  Patient is having mouth sores and trouble eating and had questions about ONS and if any assistance with those.  Received pt voicemail when called, left voicemail and will await return phone call.      Noted pt comes into [facility] for diagnosis of Nodular Lymphocyte Predominant Hodgkin's Lymphoma and receiving chemo.  Per her schedule, appears to be coming back into center in 2 weeks or so.  If no call back, will plan to see pt during her treatment.       Patient called back this afternoon.  We discussed softer foods including high calorie, high protein foods.  Patient willing to accept coupons for Boost, and Ensure.  Will mail to patient.

## 2023-10-20 ENCOUNTER — NURSE TRIAGE (OUTPATIENT)
Dept: ADMISSION | Facility: HOSPITAL | Age: 53
End: 2023-10-20
Payer: COMMERCIAL

## 2023-10-20 NOTE — TELEPHONE ENCOUNTER
Patient states she went to get her nails done at the salon today and noticed purple coloring around her cuticles. Patient took pictures will be sending them via my chart tonya. Patient states she is not experiencing pain or numbness, denies difficulty with . Patient also reports she had purple spots appear on her tongue after her first treatment as well. They have resolved.   Additional Information   Have you called us about this problem before (if yes, when)?     Patient states she spoke with oncall fellow after last treatment discussing purple spots on tongue was advised to increase fluid intake.   Commented on: When did these problems start?     Noticed today at [facility]    Protocols used: Other

## 2023-10-21 NOTE — TELEPHONE ENCOUNTER
Pictures werent received. Patient was encouraged to keep a picture like diary to discuss with provider at follow up as well during the call. Patient also stated that she continued with her nail appt and has another one tomorrow to get a pedicure. She will monitor her nails during that appt as well. No complaints of  issues, pain or numbness. Will call office if symptoms change.

## 2023-10-31 ENCOUNTER — OFFICE VISIT (OUTPATIENT)
Dept: HEMATOLOGY/ONCOLOGY | Facility: CLINIC | Age: 53
End: 2023-10-31
Payer: COMMERCIAL

## 2023-10-31 ENCOUNTER — INFUSION (OUTPATIENT)
Dept: HEMATOLOGY/ONCOLOGY | Facility: CLINIC | Age: 53
End: 2023-10-31
Payer: COMMERCIAL

## 2023-10-31 VITALS
BODY MASS INDEX: 34.79 KG/M2 | TEMPERATURE: 97.3 F | OXYGEN SATURATION: 98 % | WEIGHT: 211.86 LBS | SYSTOLIC BLOOD PRESSURE: 130 MMHG | HEART RATE: 110 BPM | DIASTOLIC BLOOD PRESSURE: 84 MMHG | RESPIRATION RATE: 18 BRPM

## 2023-10-31 DIAGNOSIS — C81.00 NODULAR LYMPHOCYTE PREDOMINANT HODGKIN LYMPHOMA, UNSPECIFIED BODY REGION (MULTI): Primary | ICD-10-CM

## 2023-10-31 LAB
ALBUMIN SERPL BCP-MCNC: 4 G/DL (ref 3.4–5)
ALP SERPL-CCNC: 88 U/L (ref 33–110)
ALT SERPL W P-5'-P-CCNC: 28 U/L (ref 7–45)
ANION GAP SERPL CALC-SCNC: 11 MMOL/L (ref 10–20)
AST SERPL W P-5'-P-CCNC: 24 U/L (ref 9–39)
BASOPHILS # BLD AUTO: 0.01 X10*3/UL (ref 0–0.1)
BASOPHILS NFR BLD AUTO: 0.2 %
BILIRUB SERPL-MCNC: 0.3 MG/DL (ref 0–1.2)
BUN SERPL-MCNC: 14 MG/DL (ref 6–23)
CALCIUM SERPL-MCNC: 9.4 MG/DL (ref 8.6–10.3)
CHLORIDE SERPL-SCNC: 106 MMOL/L (ref 98–107)
CO2 SERPL-SCNC: 29 MMOL/L (ref 21–32)
CREAT SERPL-MCNC: 0.84 MG/DL (ref 0.5–1.05)
EOSINOPHIL # BLD AUTO: 0.02 X10*3/UL (ref 0–0.7)
EOSINOPHIL NFR BLD AUTO: 0.4 %
ERYTHROCYTE [DISTWIDTH] IN BLOOD BY AUTOMATED COUNT: 17 % (ref 11.5–14.5)
GFR SERPL CREATININE-BSD FRML MDRD: 83 ML/MIN/1.73M*2
GLUCOSE SERPL-MCNC: 67 MG/DL (ref 74–99)
HCT VFR BLD AUTO: 31.9 % (ref 36–46)
HGB BLD-MCNC: 9.8 G/DL (ref 12–16)
IMM GRANULOCYTES # BLD AUTO: 0.03 X10*3/UL (ref 0–0.7)
IMM GRANULOCYTES NFR BLD AUTO: 0.6 % (ref 0–0.9)
LDH SERPL L TO P-CCNC: 181 U/L (ref 84–246)
LYMPHOCYTES # BLD AUTO: 0.83 X10*3/UL (ref 1.2–4.8)
LYMPHOCYTES NFR BLD AUTO: 15.7 %
MCH RBC QN AUTO: 28.2 PG (ref 26–34)
MCHC RBC AUTO-ENTMCNC: 30.7 G/DL (ref 32–36)
MCV RBC AUTO: 92 FL (ref 80–100)
MONOCYTES # BLD AUTO: 0.62 X10*3/UL (ref 0.1–1)
MONOCYTES NFR BLD AUTO: 11.7 %
NEUTROPHILS # BLD AUTO: 3.78 X10*3/UL (ref 1.2–7.7)
NEUTROPHILS NFR BLD AUTO: 71.4 %
NRBC BLD-RTO: ABNORMAL /100{WBCS}
PLATELET # BLD AUTO: 392 X10*3/UL (ref 150–450)
PMV BLD AUTO: 9.1 FL (ref 7.5–11.5)
POTASSIUM SERPL-SCNC: 3.7 MMOL/L (ref 3.5–5.3)
PROT SERPL-MCNC: 6.7 G/DL (ref 6.4–8.2)
RBC # BLD AUTO: 3.48 X10*6/UL (ref 4–5.2)
SODIUM SERPL-SCNC: 142 MMOL/L (ref 136–145)
WBC # BLD AUTO: 5.3 X10*3/UL (ref 4.4–11.3)

## 2023-10-31 PROCEDURE — 96375 TX/PRO/DX INJ NEW DRUG ADDON: CPT | Mod: INF

## 2023-10-31 PROCEDURE — 1036F TOBACCO NON-USER: CPT | Performed by: INTERNAL MEDICINE

## 2023-10-31 PROCEDURE — 80053 COMPREHEN METABOLIC PANEL: CPT

## 2023-10-31 PROCEDURE — 83615 LACTATE (LD) (LDH) ENZYME: CPT

## 2023-10-31 PROCEDURE — 2500000004 HC RX 250 GENERAL PHARMACY W/ HCPCS (ALT 636 FOR OP/ED): Mod: JZ | Performed by: INTERNAL MEDICINE

## 2023-10-31 PROCEDURE — 3075F SYST BP GE 130 - 139MM HG: CPT | Performed by: INTERNAL MEDICINE

## 2023-10-31 PROCEDURE — 96367 TX/PROPH/DG ADDL SEQ IV INF: CPT

## 2023-10-31 PROCEDURE — 85025 COMPLETE CBC W/AUTO DIFF WBC: CPT

## 2023-10-31 PROCEDURE — 36415 COLL VENOUS BLD VENIPUNCTURE: CPT

## 2023-10-31 PROCEDURE — 96413 CHEMO IV INFUSION 1 HR: CPT

## 2023-10-31 PROCEDURE — 99215 OFFICE O/P EST HI 40 MIN: CPT | Performed by: INTERNAL MEDICINE

## 2023-10-31 PROCEDURE — 96415 CHEMO IV INFUSION ADDL HR: CPT

## 2023-10-31 PROCEDURE — 3079F DIAST BP 80-89 MM HG: CPT | Performed by: INTERNAL MEDICINE

## 2023-10-31 PROCEDURE — 99215 OFFICE O/P EST HI 40 MIN: CPT | Mod: 25 | Performed by: INTERNAL MEDICINE

## 2023-10-31 PROCEDURE — 96417 CHEMO IV INFUS EACH ADDL SEQ: CPT

## 2023-10-31 PROCEDURE — 96411 CHEMO IV PUSH ADDL DRUG: CPT

## 2023-10-31 PROCEDURE — 2500000001 HC RX 250 WO HCPCS SELF ADMINISTERED DRUGS (ALT 637 FOR MEDICARE OP): Performed by: INTERNAL MEDICINE

## 2023-10-31 RX ORDER — FAMOTIDINE 10 MG/ML
20 INJECTION INTRAVENOUS ONCE AS NEEDED
Status: DISCONTINUED | OUTPATIENT
Start: 2023-10-31 | End: 2023-12-17 | Stop reason: HOSPADM

## 2023-10-31 RX ORDER — EPINEPHRINE 0.3 MG/.3ML
0.3 INJECTION SUBCUTANEOUS EVERY 5 MIN PRN
Status: DISCONTINUED | OUTPATIENT
Start: 2023-10-31 | End: 2023-12-17 | Stop reason: HOSPADM

## 2023-10-31 RX ORDER — HEPARIN SODIUM,PORCINE/PF 10 UNIT/ML
50 SYRINGE (ML) INTRAVENOUS AS NEEDED
Status: CANCELLED | OUTPATIENT
Start: 2023-10-31

## 2023-10-31 RX ORDER — DIPHENHYDRAMINE HYDROCHLORIDE 50 MG/ML
50 INJECTION INTRAMUSCULAR; INTRAVENOUS AS NEEDED
Status: CANCELLED | OUTPATIENT
Start: 2023-11-28

## 2023-10-31 RX ORDER — PROCHLORPERAZINE MALEATE 5 MG
10 TABLET ORAL EVERY 6 HOURS PRN
Status: CANCELLED | OUTPATIENT
Start: 2023-11-28

## 2023-10-31 RX ORDER — PROCHLORPERAZINE EDISYLATE 5 MG/ML
10 INJECTION INTRAMUSCULAR; INTRAVENOUS EVERY 6 HOURS PRN
Status: DISCONTINUED | OUTPATIENT
Start: 2023-10-31 | End: 2023-12-17 | Stop reason: HOSPADM

## 2023-10-31 RX ORDER — DOXORUBICIN HYDROCHLORIDE 2 MG/ML
50 INJECTION, SOLUTION INTRAVENOUS ONCE
Status: COMPLETED | OUTPATIENT
Start: 2023-10-31 | End: 2023-10-31

## 2023-10-31 RX ORDER — HEPARIN 100 UNIT/ML
500 SYRINGE INTRAVENOUS AS NEEDED
Status: DISCONTINUED | OUTPATIENT
Start: 2023-10-31 | End: 2023-12-17 | Stop reason: HOSPADM

## 2023-10-31 RX ORDER — ALBUTEROL SULFATE 0.83 MG/ML
3 SOLUTION RESPIRATORY (INHALATION) AS NEEDED
Status: DISCONTINUED | OUTPATIENT
Start: 2023-10-31 | End: 2023-12-17 | Stop reason: HOSPADM

## 2023-10-31 RX ORDER — PALONOSETRON 0.05 MG/ML
0.25 INJECTION, SOLUTION INTRAVENOUS ONCE
Status: CANCELLED | OUTPATIENT
Start: 2023-11-28

## 2023-10-31 RX ORDER — PROCHLORPERAZINE EDISYLATE 5 MG/ML
10 INJECTION INTRAMUSCULAR; INTRAVENOUS EVERY 6 HOURS PRN
Status: CANCELLED | OUTPATIENT
Start: 2023-11-28

## 2023-10-31 RX ORDER — FAMOTIDINE 10 MG/ML
20 INJECTION INTRAVENOUS ONCE AS NEEDED
Status: CANCELLED | OUTPATIENT
Start: 2023-11-28

## 2023-10-31 RX ORDER — HEPARIN SODIUM,PORCINE/PF 10 UNIT/ML
50 SYRINGE (ML) INTRAVENOUS AS NEEDED
Status: DISCONTINUED | OUTPATIENT
Start: 2023-10-31 | End: 2023-12-17 | Stop reason: HOSPADM

## 2023-10-31 RX ORDER — ACETAMINOPHEN 325 MG/1
650 TABLET ORAL ONCE
Status: COMPLETED | OUTPATIENT
Start: 2023-10-31 | End: 2023-10-31

## 2023-10-31 RX ORDER — HEPARIN 100 UNIT/ML
500 SYRINGE INTRAVENOUS AS NEEDED
Status: CANCELLED | OUTPATIENT
Start: 2023-10-31

## 2023-10-31 RX ORDER — DOXORUBICIN HYDROCHLORIDE 2 MG/ML
50 INJECTION, SOLUTION INTRAVENOUS ONCE
Status: CANCELLED | OUTPATIENT
Start: 2023-11-28

## 2023-10-31 RX ORDER — PALONOSETRON 0.05 MG/ML
0.25 INJECTION, SOLUTION INTRAVENOUS ONCE
Status: COMPLETED | OUTPATIENT
Start: 2023-10-31 | End: 2023-10-31

## 2023-10-31 RX ORDER — DIPHENHYDRAMINE HYDROCHLORIDE 50 MG/ML
50 INJECTION INTRAMUSCULAR; INTRAVENOUS AS NEEDED
Status: DISCONTINUED | OUTPATIENT
Start: 2023-10-31 | End: 2023-12-17 | Stop reason: HOSPADM

## 2023-10-31 RX ORDER — PROCHLORPERAZINE MALEATE 10 MG
10 TABLET ORAL EVERY 6 HOURS PRN
Status: DISCONTINUED | OUTPATIENT
Start: 2023-10-31 | End: 2023-12-17 | Stop reason: HOSPADM

## 2023-10-31 RX ORDER — DIPHENHYDRAMINE HCL 50 MG
50 CAPSULE ORAL ONCE
Status: CANCELLED | OUTPATIENT
Start: 2023-11-28

## 2023-10-31 RX ORDER — ALBUTEROL SULFATE 0.83 MG/ML
3 SOLUTION RESPIRATORY (INHALATION) AS NEEDED
Status: CANCELLED | OUTPATIENT
Start: 2023-11-28

## 2023-10-31 RX ORDER — EPINEPHRINE 0.3 MG/.3ML
0.3 INJECTION SUBCUTANEOUS EVERY 5 MIN PRN
Status: CANCELLED | OUTPATIENT
Start: 2023-11-28

## 2023-10-31 RX ORDER — DIPHENHYDRAMINE HCL 25 MG
50 CAPSULE ORAL ONCE
Status: COMPLETED | OUTPATIENT
Start: 2023-10-31 | End: 2023-10-31

## 2023-10-31 RX ORDER — ACETAMINOPHEN 325 MG/1
650 TABLET ORAL ONCE
Status: CANCELLED | OUTPATIENT
Start: 2023-11-28

## 2023-10-31 RX ADMIN — DOXORUBICIN HYDROCHLORIDE 108 MG: 2 INJECTION, SOLUTION INTRAVENOUS at 12:17

## 2023-10-31 RX ADMIN — VINCRISTINE SULFATE 2 MG: 1 INJECTION, SOLUTION INTRAVENOUS at 13:00

## 2023-10-31 RX ADMIN — CYCLOPHOSPHAMIDE 1620 MG: 1 INJECTION, POWDER, FOR SOLUTION INTRAVENOUS; ORAL at 13:28

## 2023-10-31 RX ADMIN — PALONOSETRON 250 MCG: 0.05 INJECTION, SOLUTION INTRAVENOUS at 11:41

## 2023-10-31 RX ADMIN — ACETAMINOPHEN 650 MG: 325 TABLET ORAL at 13:25

## 2023-10-31 RX ADMIN — PROCHLORPERAZINE MALEATE 10 MG: 10 TABLET ORAL at 13:25

## 2023-10-31 RX ADMIN — DIPHENHYDRAMINE HYDROCHLORIDE 50 MG: 25 CAPSULE ORAL at 13:25

## 2023-10-31 RX ADMIN — FOSAPREPITANT 150 MG: 150 INJECTION, POWDER, LYOPHILIZED, FOR SOLUTION INTRAVENOUS at 11:41

## 2023-10-31 ASSESSMENT — PATIENT HEALTH QUESTIONNAIRE - PHQ9
2. FEELING DOWN, DEPRESSED OR HOPELESS: SEVERAL DAYS
10. IF YOU CHECKED OFF ANY PROBLEMS, HOW DIFFICULT HAVE THESE PROBLEMS MADE IT FOR YOU TO DO YOUR WORK, TAKE CARE OF THINGS AT HOME, OR GET ALONG WITH OTHER PEOPLE: SOMEWHAT DIFFICULT
8. MOVING OR SPEAKING SO SLOWLY THAT OTHER PEOPLE COULD HAVE NOTICED. OR THE OPPOSITE, BEING SO FIGETY OR RESTLESS THAT YOU HAVE BEEN MOVING AROUND A LOT MORE THAN USUAL: NOT AT ALL
8. MOVING OR SPEAKING SO SLOWLY THAT OTHER PEOPLE COULD HAVE NOTICED. OR THE OPPOSITE, BEING SO FIGETY OR RESTLESS THAT YOU HAVE BEEN MOVING AROUND A LOT MORE THAN USUAL: NOT AT ALL
1. LITTLE INTEREST OR PLEASURE IN DOING THINGS: 1
5. POOR APPETITE OR OVEREATING: NEARLY EVERY DAY
3. TROUBLE FALLING OR STAYING ASLEEP OR SLEEPING TOO MUCH: MORE THAN HALF THE DAYS
10. IF YOU CHECKED OFF ANY PROBLEMS, HOW DIFFICULT HAVE THESE PROBLEMS MADE IT FOR YOU TO DO YOUR WORK, TAKE CARE OF THINGS AT HOME, OR GET ALONG WITH OTHER PEOPLE: SOMEWHAT DIFFICULT
6. FEELING BAD ABOUT YOURSELF - OR THAT YOU ARE A FAILURE OR HAVE LET YOURSELF OR YOUR FAMILY DOWN: 1
8. MOVING OR SPEAKING SO SLOWLY THAT OTHER PEOPLE COULD HAVE NOTICED. OR THE OPPOSITE, BEING SO FIGETY OR RESTLESS THAT YOU HAVE BEEN MOVING AROUND A LOT MORE THAN USUAL: NOT AT ALL
1. LITTLE INTEREST OR PLEASURE IN DOING THINGS: 1
6. FEELING BAD ABOUT YOURSELF - OR THAT YOU ARE A FAILURE OR HAVE LET YOURSELF OR YOUR FAMILY DOWN: SEVERAL DAYS
SUM OF ALL RESPONSES TO PHQ QUESTIONS 1-9: 11
SUM OF ALL RESPONSES TO PHQ QUESTIONS 1-9: 11
9. THOUGHTS THAT YOU WOULD BE BETTER OFF DEAD, OR OF HURTING YOURSELF: NOT AT ALL
9. THOUGHTS THAT YOU WOULD BE BETTER OFF DEAD, OR OF HURTING YOURSELF: NOT AT ALL
7. TROUBLE CONCENTRATING ON THINGS, SUCH AS READING THE NEWSPAPER OR WATCHING TELEVISION: 0
7. TROUBLE CONCENTRATING ON THINGS, SUCH AS READING THE NEWSPAPER OR WATCHING TELEVISION: NOT AT ALL
8. MOVING OR SPEAKING SO SLOWLY THAT OTHER PEOPLE COULD HAVE NOTICED. OR THE OPPOSITE, BEING SO FIGETY OR RESTLESS THAT YOU HAVE BEEN MOVING AROUND A LOT MORE THAN USUAL: 0
6. FEELING BAD ABOUT YOURSELF - OR THAT YOU ARE A FAILURE OR HAVE LET YOURSELF OR YOUR FAMILY DOWN: SEVERAL DAYS
2. FEELING DOWN, DEPRESSED OR HOPELESS: SEVERAL DAYS
4. FEELING TIRED OR HAVING LITTLE ENERGY: 3
5. POOR APPETITE OR OVEREATING: NEARLY EVERY DAY
1. LITTLE INTEREST OR PLEASURE IN DOING THINGS: SEVERAL DAYS
10. IF YOU CHECKED OFF ANY PROBLEMS, HOW DIFFICULT HAVE THESE PROBLEMS MADE IT FOR YOU TO DO YOUR WORK, TAKE CARE OF THINGS AT HOME, OR GET ALONG WITH OTHER PEOPLE: SOMEWHAT DIFFICULT
2. FEELING DOWN, DEPRESSED, IRRITABLE, OR HOPELESS: 1
6. FEELING BAD ABOUT YOURSELF - OR THAT YOU ARE A FAILURE OR HAVE LET YOURSELF OR YOUR FAMILY DOWN: SEVERAL DAYS
1. LITTLE INTEREST OR PLEASURE IN DOING THINGS: SEVERAL DAYS
9. THOUGHTS THAT YOU WOULD BE BETTER OFF DEAD, OR OF HURTING YOURSELF: NOT AT ALL
1. LITTLE INTEREST OR PLEASURE IN DOING THINGS: SEVERAL DAYS
7. TROUBLE CONCENTRATING ON THINGS, SUCH AS READING THE NEWSPAPER OR WATCHING TELEVISION: NOT AT ALL
9. THOUGHTS THAT YOU WOULD BE BETTER OFF DEAD, OR OF HURTING YOURSELF: NOT AT ALL
7. TROUBLE CONCENTRATING ON THINGS, SUCH AS READING THE NEWSPAPER OR WATCHING TELEVISION: NOT AT ALL
5. POOR APPETITE OR OVEREATING: NEARLY EVERY DAY
4. FEELING TIRED OR HAVING LITTLE ENERGY: NEARLY EVERY DAY
6. FEELING BAD ABOUT YOURSELF - OR THAT YOU ARE A FAILURE OR HAVE LET YOURSELF OR YOUR FAMILY DOWN: 1
8. MOVING OR SPEAKING SO SLOWLY THAT OTHER PEOPLE COULD HAVE NOTICED. OR THE OPPOSITE, BEING SO FIGETY OR RESTLESS THAT YOU HAVE BEEN MOVING AROUND A LOT MORE THAN USUAL: NOT AT ALL
8. MOVING OR SPEAKING SO SLOWLY THAT OTHER PEOPLE COULD HAVE NOTICED. OR THE OPPOSITE, BEING SO FIGETY OR RESTLESS THAT YOU HAVE BEEN MOVING AROUND A LOT MORE THAN USUAL: 0
4. FEELING TIRED OR HAVING LITTLE ENERGY: NEARLY EVERY DAY
9. THOUGHTS THAT YOU WOULD BE BETTER OFF DEAD, OR OF HURTING YOURSELF: 0
5. POOR APPETITE OR OVEREATING: 3
5. POOR APPETITE OR OVEREATING: NEARLY EVERY DAY
4. FEELING TIRED OR HAVING LITTLE ENERGY: 3
3. TROUBLE FALLING OR STAYING ASLEEP OR SLEEPING TOO MUCH: 2
10. IF YOU CHECKED OFF ANY PROBLEMS, HOW DIFFICULT HAVE THESE PROBLEMS MADE IT FOR YOU TO DO YOUR WORK, TAKE CARE OF THINGS AT HOME, OR GET ALONG WITH OTHER PEOPLE: SOMEWHAT DIFFICULT
SUM OF ALL RESPONSES TO PHQ QUESTIONS 1-9: 11
6. FEELING BAD ABOUT YOURSELF - OR THAT YOU ARE A FAILURE OR HAVE LET YOURSELF OR YOUR FAMILY DOWN: SEVERAL DAYS
5. POOR APPETITE OR OVEREATING: 3
3. TROUBLE FALLING OR STAYING ASLEEP OR SLEEPING TOO MUCH: MORE THAN HALF THE DAYS
4. FEELING TIRED OR HAVING LITTLE ENERGY: NEARLY EVERY DAY
2. FEELING DOWN, DEPRESSED, IRRITABLE, OR HOPELESS: SEVERAL DAYS
3. TROUBLE FALLING OR STAYING ASLEEP OR SLEEPING TOO MUCH: 2
7. TROUBLE CONCENTRATING ON THINGS, SUCH AS READING THE NEWSPAPER OR WATCHING TELEVISION: NOT AT ALL
9. THOUGHTS THAT YOU WOULD BE BETTER OFF DEAD, OR OF HURTING YOURSELF: 0
7. TROUBLE CONCENTRATING ON THINGS, SUCH AS READING THE NEWSPAPER OR WATCHING TELEVISION: 0
1. LITTLE INTEREST OR PLEASURE IN DOING THINGS: SEVERAL DAYS
2. FEELING DOWN, DEPRESSED, IRRITABLE, OR HOPELESS: SEVERAL DAYS
3. TROUBLE FALLING OR STAYING ASLEEP OR SLEEPING TOO MUCH: MORE THAN HALF THE DAYS
SUM OF ALL RESPONSES TO PHQ QUESTIONS 1-9: 11
3. TROUBLE FALLING OR STAYING ASLEEP OR SLEEPING TOO MUCH: MORE THAN HALF THE DAYS
2. FEELING DOWN, DEPRESSED, IRRITABLE, OR HOPELESS: 1
4. FEELING TIRED OR HAVING LITTLE ENERGY: NEARLY EVERY DAY

## 2023-10-31 ASSESSMENT — PAIN SCALES - GENERAL: PAINLEVEL: 0-NO PAIN

## 2023-10-31 ASSESSMENT — ENCOUNTER SYMPTOMS
DEPRESSION: 0
OCCASIONAL FEELINGS OF UNSTEADINESS: 0
LOSS OF SENSATION IN FEET: 0

## 2023-10-31 NOTE — PROGRESS NOTES
History of Present Illness:      ID Statement:    CHATA CAMPOS is a 53 year old Female        Chief Complaint: NLPHL   Interval History:    Patient with newly diagnosed nodular lymphocyte predominant Hodgkin's Lymphoma is here for further discussion and management.     Patient was seen for chronic back pain and MRI done on 7/11/23 showed DJD and worsening retroperitoneal LN's. CT/PET on 7/17/23 confirmed FDG avid right inguinal, ileac and retroperitoneal LN's. She was seen by Surg onc and excisional right inguinal LN  biopsy was performed on 8/14/23. 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  She is here with her  and her sister.      9/19/23: here for cycle #1 R-CHOP. discussed benefits/risks. consent obtained. doing well. No new c/o  10/31/23: due for cycle #3. C/o mouth soreness for ~ 9 days after chemo. No recent f/c/n/v/d. Some constipation     ROS: negative except in HPI     PMHx: chronic LBP with herniated disc at L5-S1 causing sciatica down her right leg.  PSHX; c-sction     FHx; Mother with breast cancer, father with colon cancer, her maternal aunt with uterine ca and breat ca.        Allergies and Intolerances:       Allergies:         hydrocodone: Drug, Rash, Active     Outpatient Medication Profile:  * Patient Currently Takes Medications as of 20-Sep-2023 08:09 documented in Structured Notes         predniSONE 50 mg oral tablet : 2 tab(s) orally once a day , Start Date: 19-Sep-2023         amLODIPine 5 mg oral tablet: 1 tab(s) orally once a day , Start Date: 11-Feb-2019         gabapentin 300 mg oral capsule: 1 cap(s) orally 3 times a day, As Needed         meclizine 25 mg oral tablet: 1 tab(s) orally 3 times a day, As Needed         CeleBREX 100 mg oral capsule: 1 cap(s) orally 2 times a day, As Needed-  holding for procedure          metFORMIN 500 mg oral tablet: 1 tab(s) orally once a day         multivitamin: 1   once a day -holding for  procedure         Flexeril 10 mg oral tablet: 1 tab(s) orally once a day (at bedtime),  As Needed         Wegovy (2.4 mg dose) subcutaneous solution: subcutaneous once a week-  Wednesdays         clobetasol 0.05% topical ointment: Apply topically to affected area 2  times a day, As Needed         ciclopirox 0.77% topical lotion: Apply topically to affected area 2 times  a day, As Needed        Family History: No Family History items are recorded  in the problem list.      Social History:   Social Substance History:  ·  Smoking Status never smoker (1)   ·  Additional History     lives with , works at post office as a (1)           Vitals and Measurements:   Vitals: Temp: 36.2  HR: 76  RR: 18  BP: 126/85  SPO2%:   97   Measurements: HT(cm): 163.2  WT(kg): 98.8  BSA: 2.11   BMI:  37      Physical Exam:      Constitutional: Well developed, awake/alert/oriented  x3, no distress, alert and cooperative   Eyes: PERRL, EOMI, clear sclera   ENMT: mucous membranes moist, no apparent injury,  no lesions seen   Head/Neck: Neck supple, no apparent injury, thyroid  without mass or tenderness, No JVD, trachea midline, no bruits   Respiratory/Thorax: Patent airways, CTAB, normal  breath sounds with good chest expansion, thorax symmetric   Cardiovascular: Regular, rate and rhythm, no murmurs,  2+ equal pulses of the extremities, normal S 1and S 2   Gastrointestinal: Nondistended, soft, non-tender,  no rebound tenderness or guarding, no masses palpable, no organomegaly, +BS, no bruits   Genitourinary: No Discharge, vesicles or other abnormalities   Musculoskeletal: ROM intact, no joint swelling, normal  strength   Extremities: normal extremities, no cyanosis edema,  contusions or wounds, no clubbing   Neurological: alert and oriented x3, intact senses,  motor, response and reflexes, normal strength   Breast: No masses, tenderness, no discharge or discoloration   Lymphatic: No significant lymphadenopathy    Psychological: Appropriate mood and behavior   Skin: Warm and dry, no lesions, no rashes      Radiology Result:     ·  Results           Conclusion:  Normal sinus rhythm  Minimal voltage criteria for LVH, may be normal variant  Nonspecific T wave abnormality  Abnormal ECG  No previous ECGs available  Confirmed by Heriberto Pradhan ([id number]) on 8/14/2023 9:02:27 AM      Electrocardiogram 12 Lead [Aug 14 2023  9:02AM]        Impression:     1. Hypermetabolic lymphadenopathy within the para-aortic, retrocaval,  right common iliac, right external iliac and right inguinal lymph  node stations. Findings are suggestive of a lymphomatous process,  correlate with tissue sampling for further evaluation.  2. No abnormal focal hypermetabolic lesion within the remainder of  the visualized body.         PET/CT Retroperitoneum/Peritonuem INITIAL ONLY [Jul 27 2023  2:37PM]        Impression:     1. Mild progression of degenerative changesmost pronounced at L5-S1.  2. Partially visualized retroperitoneal lymphadenopathy is worsened  from previous and nonspecific. Recommend comparison with prior  outside imaging or dedicated CT/MRI for further evaluation.     A message was sent to the ordering provider at the time of this  dictation using the [employer] system.      MRI L Spine without Contrast [Jul 11 2023  1:57PM]        Impression:     1.  Heterogeneous myometrium without discrete fibroid detected.  2. 10 mm thick endometrium is abnormal in the setting of a  postmenopausal female with reported history of postmenopausal  bleeding. Further gynecologic workup is advised.      Ultrasound Pelvis Transabdominal With Transvaginal [Jun 20 2023  4:02PM]        Pathology Results:     ·  Results     FINAL DIAGNOSIS   A. LYMPH NODE, RIGHT INGUINAL, EXCISION: 8/14/23    -- NODULAR LYMPHOCYTE PREDOMINANT HODGKIN LYMPHOMA (WHO 2022 Classification )  /   NODULAR LYMPHOCYTE PREDOMINANT B-CELL LYMPHOMA (ICC 2022 Classification),   (NLPHL) SEE NOTE      NOTE: The lymphoma demonstrates a variant immunoarchitectural pattern (features   of Fan patterns C and D). NLPHL with these variant  immunoarchitectural patterns   have been associated with less indolent clinical behavior than the typical   immunoarchitectural pattern of NLPHL (pattern A). The lymphoma may be   considered grade 2 by ICC 2022 criteria (less well formed nodules  with   prominent infiltration of background T cells and reduction of background B   cells; Spindale et al, 2022. Blood [id number]). No definite morphologic evidence of   diffuse large B cell lymphoma is seen.     MORPHOLOGY: The specimen  is an enlarged lymph node which is entirely submitted   for histologic evaluation. The sections show similar histology. They show lymph   node architectural effacement by a vaguely nodular proliferation composed of a   predominance of small  lymphocytes with some histiocytes, and scattered large   atypical cells with vesicular chromatin, some with prominent nucleoli, and some   with folded or lobulated nuclei.     IMMUNOHISTOCHEMISTRY & IN SITU HYBRIDIZATION:   Stains performed  on block A8 revealed:   CD3, CD5: highlights abundant background T cells within an outside vague   nodules   PD-1: Positive in abundant background T cells and highlights cells forming   rosettes around neoplastic large cells (LP cells)    CD20: Positive in LP cells within an outside nodules. Also highlights B   cells forming a subset of background cells in nodules. Variegated pattern of   staining is consistent with infiltration of background T cells within nodules.   PAX5,  CD79a, CD19: similar to CD20; weaker staining of LP cells.   OCT2: similar to CD20; strongly positive in LP cells compared with   background B cells.   BOB1: similar to CD20.   CD10: Negative in LP cells BCL6: Positive in LP cells.    MUM1, Kappa, Lambda: MUM1 is weakly positive in a subset of LP cells. MUM1   is slso positive in a subset of background cells  which do not colocalize with   kappa and lambda stains (are not plasma cells). A subset of background T cells   in  NLPHL can be positive for MUM1. Kappa and Lambda highlight few polytypic   plasma cells.   CD21 (performed on blocks A5 and A8): highlights distorted follicular   dendritic cell meshworks associated with distorted ill-defined nodules    CD23: Also highlights few follicular dendritic cell meshworksbut   highlights these meshworks less prominently than CD21. CD23 also stains few   background lymphocytes.   Cyclin D1: negative in lymphoid cells.   CD30: highlights few immunoblasts;  appears negative in LP cells.   CD15: negative in LPcells.   EBV (WINSTON in situ hybridization) : negative in LP cells.                           a     Assessment and Plan:      Assessment and Plan:   Assessment:    Patient with newly diagnosed nodular lymphocyte predominant Hodgkin's Lymphoma is here for further discussion and management.  Plan:    Nodular Lymphocyte predominant B cell Lymphoma(Nodular Lymphocyte predominant Hodgkin's Lymphoma: NLPHL) stage II  - discussed lymphoma in general, diagnosis, prognosis and treatment options and they understood well.  - recommended R-CHOP for 6 cycles.  - echocadiogram: normal EF  - LDH, CBC,CMP, hepatitis panel reviewed with patient.  - R-CHOP on 9/20/23, 10/10 due for C3 today 10/31  - Long acting neupogen denies based on NCCN guideline per insurance company.  - continue to use mouth wash as recommended for mucositis  - will monitor CBC closely        LBP  - cont current meds     RTC 3 weeks for C4  Will delay one week                         R

## 2023-10-31 NOTE — SIGNIFICANT EVENT
10/31/23 1125   Prechemo Checklist   Has the patient been in the hospital, ED, or urgent care since last date of service No   Chemo/Immuno Consent Signed Yes   Protocol/Indications Verified Yes   Confirmed to previous date/time of medication Yes   Compared to previous dose Yes   All medications are dated accurately Yes   Pregnancy Test Negative Not applicable   Parameters Met Yes   BSA/Weight-Height Verified Yes   Dose Calculations Verified Yes

## 2023-11-10 ENCOUNTER — NURSE TRIAGE (OUTPATIENT)
Dept: ADMISSION | Facility: HOSPITAL | Age: 53
End: 2023-11-10
Payer: COMMERCIAL

## 2023-11-10 DIAGNOSIS — C81.00 NODULAR LYMPHOCYTE PREDOMINANT HODGKIN LYMPHOMA, UNSPECIFIED BODY REGION (MULTI): Primary | ICD-10-CM

## 2023-11-10 RX ORDER — ONDANSETRON HYDROCHLORIDE 8 MG/1
8 TABLET, FILM COATED ORAL 3 TIMES DAILY PRN
Qty: 20 TABLET | Refills: 2 | Status: SHIPPED | OUTPATIENT
Start: 2023-11-10 | End: 2023-12-10

## 2023-11-10 NOTE — TELEPHONE ENCOUNTER
Discussed with patient provider response.     Okay for mucinex and refill for zofran sent to pharmacy.     Antiemetic education provider. Patient verbalized understanding will call if further needs.

## 2023-11-10 NOTE — TELEPHONE ENCOUNTER
Patient also states she is also experiencing thick like phlegm, that is stuck in the back of her throat. Unable to clear throat fully. Able to eat and drink with out difficulty, however concern with thick phlegm.     Encouraged patient to increased fluid to help with thick phlegm. Denies tongue swelling.     Inquiring if she can take over the counter mucinex.     Patient states she has been experiencing nausea, normally she experiencing with each cycle however it ends by day 5. After this cycle the nausea has been constant, no vomiting. Patient only has compazine, out of zofran. Medication refill sent to provider.   Additional Information   Commented on: Are you just feeling nauseous (sick to your stomach) or are you also throwing up?     Started right after treatment, normally it ends after day 5. But it has been all day every day since 10/31/2023- cycle 3   Commented on: Are you having any of these problems?     Diarrhea after each treatment is a common side effect, for patient. Resolved.   Commented on: What helps these problems?     Has compazine, no relief    Patient is out of zofran, message sent to provider for refill   Commented on: What are you eating or drinking? How often?     Eating and drinking without difficulty   Commented on: When was the last time you had a bowel movement? Is this/what is normal for you?     Wednesday was last bowel movement, normal    Protocols used: Nausea/Vomiting

## 2023-11-27 RX ORDER — PROCHLORPERAZINE EDISYLATE 5 MG/ML
10 INJECTION INTRAMUSCULAR; INTRAVENOUS EVERY 6 HOURS PRN
Status: CANCELLED | OUTPATIENT
Start: 2023-12-19

## 2023-11-27 RX ORDER — PROCHLORPERAZINE MALEATE 10 MG
10 TABLET ORAL EVERY 6 HOURS PRN
Status: CANCELLED | OUTPATIENT
Start: 2023-12-19

## 2023-11-27 RX ORDER — ACETAMINOPHEN 325 MG/1
650 TABLET ORAL ONCE
Status: CANCELLED | OUTPATIENT
Start: 2023-12-19

## 2023-11-27 RX ORDER — ALBUTEROL SULFATE 0.83 MG/ML
3 SOLUTION RESPIRATORY (INHALATION) AS NEEDED
Status: CANCELLED | OUTPATIENT
Start: 2023-12-19

## 2023-11-27 RX ORDER — DIPHENHYDRAMINE HCL 25 MG
50 CAPSULE ORAL ONCE
Status: CANCELLED | OUTPATIENT
Start: 2023-12-19

## 2023-11-27 RX ORDER — PALONOSETRON 0.05 MG/ML
0.25 INJECTION, SOLUTION INTRAVENOUS ONCE
Status: CANCELLED | OUTPATIENT
Start: 2023-12-19

## 2023-11-27 RX ORDER — DOXORUBICIN HYDROCHLORIDE 2 MG/ML
50 INJECTION, SOLUTION INTRAVENOUS ONCE
Status: CANCELLED | OUTPATIENT
Start: 2023-12-19

## 2023-11-27 RX ORDER — DIPHENHYDRAMINE HYDROCHLORIDE 50 MG/ML
50 INJECTION INTRAMUSCULAR; INTRAVENOUS AS NEEDED
Status: CANCELLED | OUTPATIENT
Start: 2023-12-19

## 2023-11-27 RX ORDER — FAMOTIDINE 10 MG/ML
20 INJECTION INTRAVENOUS ONCE AS NEEDED
Status: CANCELLED | OUTPATIENT
Start: 2023-12-19

## 2023-11-27 RX ORDER — EPINEPHRINE 0.3 MG/.3ML
0.3 INJECTION SUBCUTANEOUS EVERY 5 MIN PRN
Status: CANCELLED | OUTPATIENT
Start: 2023-12-19

## 2023-11-28 ENCOUNTER — OFFICE VISIT (OUTPATIENT)
Dept: HEMATOLOGY/ONCOLOGY | Facility: CLINIC | Age: 53
End: 2023-11-28
Payer: COMMERCIAL

## 2023-11-28 ENCOUNTER — INFUSION (OUTPATIENT)
Dept: HEMATOLOGY/ONCOLOGY | Facility: CLINIC | Age: 53
End: 2023-11-28
Payer: COMMERCIAL

## 2023-11-28 VITALS
BODY MASS INDEX: 34.25 KG/M2 | OXYGEN SATURATION: 100 % | HEART RATE: 88 BPM | SYSTOLIC BLOOD PRESSURE: 137 MMHG | TEMPERATURE: 97.5 F | RESPIRATION RATE: 16 BRPM | DIASTOLIC BLOOD PRESSURE: 94 MMHG | WEIGHT: 208.6 LBS

## 2023-11-28 DIAGNOSIS — C81.00 NODULAR LYMPHOCYTE PREDOMINANT HODGKIN LYMPHOMA, UNSPECIFIED BODY REGION (MULTI): ICD-10-CM

## 2023-11-28 LAB
ALBUMIN SERPL BCP-MCNC: 4.1 G/DL (ref 3.4–5)
ALP SERPL-CCNC: 81 U/L (ref 33–110)
ALT SERPL W P-5'-P-CCNC: 15 U/L (ref 7–45)
ANION GAP SERPL CALC-SCNC: 14 MMOL/L (ref 10–20)
AST SERPL W P-5'-P-CCNC: 17 U/L (ref 9–39)
BASOPHILS # BLD AUTO: 0.02 X10*3/UL (ref 0–0.1)
BASOPHILS NFR BLD AUTO: 0.4 %
BILIRUB SERPL-MCNC: 0.4 MG/DL (ref 0–1.2)
BUN SERPL-MCNC: 14 MG/DL (ref 6–23)
CALCIUM SERPL-MCNC: 9.3 MG/DL (ref 8.6–10.3)
CHLORIDE SERPL-SCNC: 104 MMOL/L (ref 98–107)
CO2 SERPL-SCNC: 25 MMOL/L (ref 21–32)
CREAT SERPL-MCNC: 0.78 MG/DL (ref 0.5–1.05)
EOSINOPHIL # BLD AUTO: 0.24 X10*3/UL (ref 0–0.7)
EOSINOPHIL NFR BLD AUTO: 4.5 %
ERYTHROCYTE [DISTWIDTH] IN BLOOD BY AUTOMATED COUNT: 17.4 % (ref 11.5–14.5)
GFR SERPL CREATININE-BSD FRML MDRD: >90 ML/MIN/1.73M*2
GLUCOSE SERPL-MCNC: 89 MG/DL (ref 74–99)
HCT VFR BLD AUTO: 32.5 % (ref 36–46)
HGB BLD-MCNC: 10.1 G/DL (ref 12–16)
IMM GRANULOCYTES # BLD AUTO: 0.01 X10*3/UL (ref 0–0.7)
IMM GRANULOCYTES NFR BLD AUTO: 0.2 % (ref 0–0.9)
LDH SERPL L TO P-CCNC: 185 U/L (ref 84–246)
LYMPHOCYTES # BLD AUTO: 0.96 X10*3/UL (ref 1.2–4.8)
LYMPHOCYTES NFR BLD AUTO: 17.9 %
MCH RBC QN AUTO: 28.5 PG (ref 26–34)
MCHC RBC AUTO-ENTMCNC: 31.1 G/DL (ref 32–36)
MCV RBC AUTO: 92 FL (ref 80–100)
MONOCYTES # BLD AUTO: 0.66 X10*3/UL (ref 0.1–1)
MONOCYTES NFR BLD AUTO: 12.3 %
NEUTROPHILS # BLD AUTO: 3.46 X10*3/UL (ref 1.2–7.7)
NEUTROPHILS NFR BLD AUTO: 64.7 %
NRBC BLD-RTO: ABNORMAL /100{WBCS}
PLATELET # BLD AUTO: 289 X10*3/UL (ref 150–450)
POTASSIUM SERPL-SCNC: 3.9 MMOL/L (ref 3.5–5.3)
PROT SERPL-MCNC: 6.8 G/DL (ref 6.4–8.2)
RBC # BLD AUTO: 3.54 X10*6/UL (ref 4–5.2)
SODIUM SERPL-SCNC: 139 MMOL/L (ref 136–145)
WBC # BLD AUTO: 5.4 X10*3/UL (ref 4.4–11.3)

## 2023-11-28 PROCEDURE — 80053 COMPREHEN METABOLIC PANEL: CPT

## 2023-11-28 PROCEDURE — 85025 COMPLETE CBC W/AUTO DIFF WBC: CPT

## 2023-11-28 PROCEDURE — 2500000001 HC RX 250 WO HCPCS SELF ADMINISTERED DRUGS (ALT 637 FOR MEDICARE OP): Performed by: INTERNAL MEDICINE

## 2023-11-28 PROCEDURE — 96367 TX/PROPH/DG ADDL SEQ IV INF: CPT

## 2023-11-28 PROCEDURE — 83615 LACTATE (LD) (LDH) ENZYME: CPT

## 2023-11-28 PROCEDURE — 96411 CHEMO IV PUSH ADDL DRUG: CPT

## 2023-11-28 PROCEDURE — 1036F TOBACCO NON-USER: CPT | Performed by: INTERNAL MEDICINE

## 2023-11-28 PROCEDURE — 99214 OFFICE O/P EST MOD 30 MIN: CPT | Performed by: INTERNAL MEDICINE

## 2023-11-28 PROCEDURE — 96376 TX/PRO/DX INJ SAME DRUG ADON: CPT

## 2023-11-28 PROCEDURE — 36415 COLL VENOUS BLD VENIPUNCTURE: CPT

## 2023-11-28 PROCEDURE — 96415 CHEMO IV INFUSION ADDL HR: CPT

## 2023-11-28 PROCEDURE — 96413 CHEMO IV INFUSION 1 HR: CPT

## 2023-11-28 PROCEDURE — 96417 CHEMO IV INFUS EACH ADDL SEQ: CPT

## 2023-11-28 PROCEDURE — 99214 OFFICE O/P EST MOD 30 MIN: CPT | Mod: 25 | Performed by: INTERNAL MEDICINE

## 2023-11-28 PROCEDURE — 2500000004 HC RX 250 GENERAL PHARMACY W/ HCPCS (ALT 636 FOR OP/ED): Mod: JZ | Performed by: INTERNAL MEDICINE

## 2023-11-28 RX ORDER — PROCHLORPERAZINE MALEATE 10 MG
10 TABLET ORAL EVERY 6 HOURS PRN
Status: DISCONTINUED | OUTPATIENT
Start: 2023-11-28 | End: 2023-11-28 | Stop reason: HOSPADM

## 2023-11-28 RX ORDER — EPINEPHRINE 0.3 MG/.3ML
0.3 INJECTION SUBCUTANEOUS EVERY 5 MIN PRN
Status: DISCONTINUED | OUTPATIENT
Start: 2023-11-28 | End: 2023-11-28 | Stop reason: HOSPADM

## 2023-11-28 RX ORDER — PALONOSETRON 0.05 MG/ML
0.25 INJECTION, SOLUTION INTRAVENOUS ONCE
Status: COMPLETED | OUTPATIENT
Start: 2023-11-28 | End: 2023-11-28

## 2023-11-28 RX ORDER — DIPHENHYDRAMINE HYDROCHLORIDE 50 MG/ML
50 INJECTION INTRAMUSCULAR; INTRAVENOUS AS NEEDED
Status: DISCONTINUED | OUTPATIENT
Start: 2023-11-28 | End: 2023-11-28 | Stop reason: HOSPADM

## 2023-11-28 RX ORDER — ALBUTEROL SULFATE 0.83 MG/ML
3 SOLUTION RESPIRATORY (INHALATION) AS NEEDED
Status: DISCONTINUED | OUTPATIENT
Start: 2023-11-28 | End: 2023-11-28 | Stop reason: HOSPADM

## 2023-11-28 RX ORDER — ACETAMINOPHEN 325 MG/1
650 TABLET ORAL ONCE
Status: COMPLETED | OUTPATIENT
Start: 2023-11-28 | End: 2023-11-28

## 2023-11-28 RX ORDER — FAMOTIDINE 10 MG/ML
20 INJECTION INTRAVENOUS ONCE AS NEEDED
Status: DISCONTINUED | OUTPATIENT
Start: 2023-11-28 | End: 2023-11-28 | Stop reason: HOSPADM

## 2023-11-28 RX ORDER — PROCHLORPERAZINE EDISYLATE 5 MG/ML
10 INJECTION INTRAMUSCULAR; INTRAVENOUS EVERY 6 HOURS PRN
Status: DISCONTINUED | OUTPATIENT
Start: 2023-11-28 | End: 2023-11-28 | Stop reason: HOSPADM

## 2023-11-28 RX ORDER — DIPHENHYDRAMINE HCL 25 MG
50 CAPSULE ORAL ONCE
Status: COMPLETED | OUTPATIENT
Start: 2023-11-28 | End: 2023-11-28

## 2023-11-28 RX ORDER — DOXORUBICIN HYDROCHLORIDE 2 MG/ML
50 INJECTION, SOLUTION INTRAVENOUS ONCE
Status: COMPLETED | OUTPATIENT
Start: 2023-11-28 | End: 2023-11-28

## 2023-11-28 RX ADMIN — DIPHENHYDRAMINE HYDROCHLORIDE 50 MG: 25 CAPSULE ORAL at 13:31

## 2023-11-28 RX ADMIN — VINCRISTINE SULFATE 2 MG: 1 INJECTION, SOLUTION INTRAVENOUS at 12:52

## 2023-11-28 RX ADMIN — DOXORUBICIN HYDROCHLORIDE 108 MG: 2 INJECTION, SOLUTION INTRAVENOUS at 12:20

## 2023-11-28 RX ADMIN — ACETAMINOPHEN 650 MG: 325 TABLET ORAL at 13:30

## 2023-11-28 RX ADMIN — SODIUM CHLORIDE 800 MG: 9 INJECTION, SOLUTION INTRAVENOUS at 14:11

## 2023-11-28 RX ADMIN — PROCHLORPERAZINE MALEATE 10 MG: 10 TABLET ORAL at 13:28

## 2023-11-28 RX ADMIN — FOSAPREPITANT DIMEGLUMINE 150 MG: 150 INJECTION, POWDER, LYOPHILIZED, FOR SOLUTION INTRAVENOUS at 11:39

## 2023-11-28 RX ADMIN — PALONOSETRON 250 MCG: 0.05 INJECTION, SOLUTION INTRAVENOUS at 11:39

## 2023-11-28 RX ADMIN — CYCLOPHOSPHAMIDE 1620 MG: 1 INJECTION, POWDER, FOR SOLUTION INTRAVENOUS; ORAL at 13:19

## 2023-11-28 ASSESSMENT — PAIN SCALES - GENERAL: PAINLEVEL: 0-NO PAIN

## 2023-11-28 NOTE — SIGNIFICANT EVENT
11/28/23 1103   Prechemo Checklist   Has the patient been in the hospital, ED, or urgent care since last date of service No   Chemo/Immuno Consent Signed Yes   Protocol/Indications Verified Yes   Confirmed to previous date/time of medication Yes   Compared to previous dose Yes   All medications are dated accurately Yes   Pregnancy Test Negative Not applicable   Parameters Met Yes   BSA/Weight-Height Verified Yes   Dose Calculations Verified Yes

## 2023-11-30 NOTE — PROGRESS NOTES
Patient ID: Alejandra Jimenes is a 53 y.o. female.    Subjective    Chief Complaint: NLPHL  Interval History:   Patient with newly diagnosed nodular lymphocyte predominant Hodgkin's Lymphoma is here for further discussion and management.     Patient was seen for chronic back pain and MRI done on 7/11/23 showed DJD and worsening retroperitoneal LN's. CT/PET on 7/17/23 confirmed FDG avid right inguinal, ileac and retroperitoneal LN's. She was seen by Surg onc and excisional right inguinal LN  biopsy was performed on 8/14/23. 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  She is here with her  and her sister.      9/19/23: here for cycle #1 R-CHOP. discussed benefits/risks. consent obtained. doing well. No new c/o  10/31/23: due for cycle #3. C/o mouth soreness for ~ 9 days after chemo. No recent f/c/n/v/d. Some constipation  11/28/23: here for Cycle#4. C/o nausea for 4-5 days after chemo. Now takes zofran prn     ROS: negative except in HPI     PMHx: chronic LBP with herniated disc at L5-S1 causing sciatica down her right leg.  PSHX; c-sction     FHx; Mother with breast cancer, father with colon cancer, her maternal aunt with uterine ca and breat ca.        Allergies and Intolerances:       Allergies:         hydrocodone: Drug, Rash, Active     Outpatient Medication Profile:  * Patient Currently Takes Medications as of 20-Sep-2023 08:09 documented in Structured Notes         predniSONE 50 mg oral tablet : 2 tab(s) orally once a day , Start Date: 19-Sep-2023         amLODIPine 5 mg oral tablet: 1 tab(s) orally once a day , Start Date: 11-Feb-2019         gabapentin 300 mg oral capsule: 1 cap(s) orally 3 times a day, As Needed         meclizine 25 mg oral tablet: 1 tab(s) orally 3 times a day, As Needed         CeleBREX 100 mg oral capsule: 1 cap(s) orally 2 times a day, As Needed-  holding for procedure          metFORMIN 500 mg oral tablet: 1 tab(s) orally once a  day         multivitamin: 1   once a day -holding for procedure         Flexeril 10 mg oral tablet: 1 tab(s) orally once a day (at bedtime),  As Needed         Wegovy (2.4 mg dose) subcutaneous solution: subcutaneous once a week-  Wednesdays         clobetasol 0.05% topical ointment: Apply topically to affected area 2  times a day, As Needed         ciclopirox 0.77% topical lotion: Apply topically to affected area 2 times  a day, As Needed          Family History: No Family History items are recorded  in the problem list.     Social History:   Social Substance History:    ·  Smoking Status never smoker (1)  ·  Additional History    lives with , works at [employer] as a (1)        Objective    BSA: There is no height or weight on file to calculate BSA.  There were no vitals taken for this visit.     Physical Exam  Constitutional:       Appearance: Normal appearance.   HENT:      Head: Normocephalic and atraumatic.      Mouth/Throat:      Mouth: Mucous membranes are moist.      Pharynx: Oropharynx is clear.   Eyes:      Extraocular Movements: Extraocular movements intact.      Pupils: Pupils are equal, round, and reactive to light.   Cardiovascular:      Rate and Rhythm: Normal rate and regular rhythm.      Heart sounds: No murmur heard.  Pulmonary:      Effort: Pulmonary effort is normal.      Breath sounds: Normal breath sounds.   Abdominal:      General: Abdomen is flat. Bowel sounds are normal.      Palpations: Abdomen is soft.   Musculoskeletal:         General: Normal range of motion.      Cervical back: Normal range of motion and neck supple.      Right lower leg: No edema.      Left lower leg: No edema.   Skin:     General: Skin is warm and dry.   Neurological:      General: No focal deficit present.      Mental Status: She is alert.   Psychiatric:         Mood and Affect: Mood normal.         Behavior: Behavior normal.         Judgment: Judgment normal.         Performance  Status:  Asymptomatic      Assessment/Plan   Assessment:   Patient with newly diagnosed nodular lymphocyte predominant Hodgkin's Lymphoma is here for further discussion and management.    Plan:   Nodular Lymphocyte predominant B cell Lymphoma(Nodular Lymphocyte predominant Hodgkin's Lymphoma: NLPHL) stage II  - discussed lymphoma in general, diagnosis, prognosis and treatment options and they understood well.  - recommended R-CHOP for 6 cycles.  - echocadiogram: normal EF  - LDH, CBC,CMP, hepatitis panel reviewed with patient.  - R-CHOP on 9/20/23, 10/10 due for C3 today 10/31  - Long acting neupogen denies based on NCCN guideline per insurance company.  - continue to use mouth wash as recommended for mucositis  - will monitor CBC closely        LBP  - cont current meds     RTC 3 weeks for C5  CT/PET prior to next cycle.      Cancer Staging   No matching staging information was found for the patient.    Oncology History   Nodular lymphocyte predominant Hodgkin lymphoma (CMS/HCC)   9/20/2023 -  Chemotherapy    R-CHOP (Cyclophosphamide / DOXOrubicin / VinCRIStine / PredniSONE) + RiTUXimab, 21 Day Cycles     9/27/2023 Initial Diagnosis    Nodular lymphocyte predominant Hodgkin lymphoma (CMS/HCC)            Ok-Carmita Boyd MD

## 2023-12-03 DIAGNOSIS — M54.12 CERVICAL RADICULOPATHY: Primary | ICD-10-CM

## 2023-12-04 RX ORDER — GABAPENTIN 300 MG/1
300 CAPSULE ORAL 3 TIMES DAILY
Qty: 90 CAPSULE | Refills: 0 | Status: SHIPPED | OUTPATIENT
Start: 2023-12-04

## 2023-12-13 ENCOUNTER — ANCILLARY PROCEDURE (OUTPATIENT)
Dept: RADIOLOGY | Facility: CLINIC | Age: 53
End: 2023-12-13
Payer: COMMERCIAL

## 2023-12-13 ENCOUNTER — APPOINTMENT (OUTPATIENT)
Dept: RADIOLOGY | Facility: CLINIC | Age: 53
End: 2023-12-13
Payer: COMMERCIAL

## 2023-12-13 DIAGNOSIS — C81.00 NODULAR LYMPHOCYTE PREDOMINANT HODGKIN LYMPHOMA, UNSPECIFIED BODY REGION (MULTI): ICD-10-CM

## 2023-12-14 ENCOUNTER — ANCILLARY PROCEDURE (OUTPATIENT)
Dept: RADIOLOGY | Facility: CLINIC | Age: 53
End: 2023-12-14
Payer: COMMERCIAL

## 2023-12-14 DIAGNOSIS — C85.90: Primary | ICD-10-CM

## 2023-12-14 PROCEDURE — A9552 F18 FDG: HCPCS | Performed by: INTERNAL MEDICINE

## 2023-12-14 PROCEDURE — 3430000001 HC RX 343 DIAGNOSTIC RADIOPHARMACEUTICALS: Performed by: INTERNAL MEDICINE

## 2023-12-14 PROCEDURE — 78815 PET IMAGE W/CT SKULL-THIGH: CPT | Mod: PS

## 2023-12-14 PROCEDURE — 78816 PET IMAGE W/CT FULL BODY: CPT | Mod: PET TUMOR SUBSQ TX STRATEGY | Performed by: STUDENT IN AN ORGANIZED HEALTH CARE EDUCATION/TRAINING PROGRAM

## 2023-12-14 RX ORDER — FLUDEOXYGLUCOSE F 18 200 MCI/ML
9.8 INJECTION, SOLUTION INTRAVENOUS
Status: COMPLETED | OUTPATIENT
Start: 2023-12-14 | End: 2023-12-14

## 2023-12-14 RX ADMIN — FLUDEOXYGLUCOSE F 18 9.8 MILLICURIE: 200 INJECTION, SOLUTION INTRAVENOUS at 11:35

## 2023-12-19 ENCOUNTER — INFUSION (OUTPATIENT)
Dept: HEMATOLOGY/ONCOLOGY | Facility: CLINIC | Age: 53
End: 2023-12-19
Payer: COMMERCIAL

## 2023-12-19 ENCOUNTER — OFFICE VISIT (OUTPATIENT)
Dept: HEMATOLOGY/ONCOLOGY | Facility: CLINIC | Age: 53
End: 2023-12-19
Payer: COMMERCIAL

## 2023-12-19 VITALS
BODY MASS INDEX: 34.14 KG/M2 | SYSTOLIC BLOOD PRESSURE: 142 MMHG | WEIGHT: 207.89 LBS | TEMPERATURE: 96.6 F | RESPIRATION RATE: 16 BRPM | OXYGEN SATURATION: 98 % | DIASTOLIC BLOOD PRESSURE: 89 MMHG | HEART RATE: 84 BPM

## 2023-12-19 VITALS — HEIGHT: 65 IN | BODY MASS INDEX: 34.64 KG/M2 | WEIGHT: 207.89 LBS

## 2023-12-19 DIAGNOSIS — C81.00 NODULAR LYMPHOCYTE PREDOMINANT HODGKIN LYMPHOMA, UNSPECIFIED BODY REGION (MULTI): ICD-10-CM

## 2023-12-19 DIAGNOSIS — C81.00 NODULAR LYMPHOCYTE PREDOMINANT HODGKIN LYMPHOMA, UNSPECIFIED BODY REGION (MULTI): Primary | ICD-10-CM

## 2023-12-19 LAB
ALBUMIN SERPL BCP-MCNC: 4.1 G/DL (ref 3.4–5)
ALP SERPL-CCNC: 85 U/L (ref 33–110)
ALT SERPL W P-5'-P-CCNC: 14 U/L (ref 7–45)
ANION GAP SERPL CALC-SCNC: 13 MMOL/L (ref 10–20)
AST SERPL W P-5'-P-CCNC: 16 U/L (ref 9–39)
BASOPHILS # BLD AUTO: 0.02 X10*3/UL (ref 0–0.1)
BASOPHILS NFR BLD AUTO: 0.5 %
BILIRUB SERPL-MCNC: 0.4 MG/DL (ref 0–1.2)
BUN SERPL-MCNC: 14 MG/DL (ref 6–23)
CALCIUM SERPL-MCNC: 9.5 MG/DL (ref 8.6–10.3)
CHLORIDE SERPL-SCNC: 101 MMOL/L (ref 98–107)
CO2 SERPL-SCNC: 28 MMOL/L (ref 21–32)
CREAT SERPL-MCNC: 0.91 MG/DL (ref 0.5–1.05)
EOSINOPHIL # BLD AUTO: 0.08 X10*3/UL (ref 0–0.7)
EOSINOPHIL NFR BLD AUTO: 2.1 %
ERYTHROCYTE [DISTWIDTH] IN BLOOD BY AUTOMATED COUNT: 17.2 % (ref 11.5–14.5)
GFR SERPL CREATININE-BSD FRML MDRD: 76 ML/MIN/1.73M*2
GLUCOSE SERPL-MCNC: 91 MG/DL (ref 74–99)
HCT VFR BLD AUTO: 31.7 % (ref 36–46)
HGB BLD-MCNC: 10.1 G/DL (ref 12–16)
IMM GRANULOCYTES # BLD AUTO: 0.02 X10*3/UL (ref 0–0.7)
IMM GRANULOCYTES NFR BLD AUTO: 0.5 % (ref 0–0.9)
LDH SERPL L TO P-CCNC: 167 U/L (ref 84–246)
LYMPHOCYTES # BLD AUTO: 0.61 X10*3/UL (ref 1.2–4.8)
LYMPHOCYTES NFR BLD AUTO: 16.4 %
MCH RBC QN AUTO: 29.4 PG (ref 26–34)
MCHC RBC AUTO-ENTMCNC: 31.9 G/DL (ref 32–36)
MCV RBC AUTO: 92 FL (ref 80–100)
MONOCYTES # BLD AUTO: 0.55 X10*3/UL (ref 0.1–1)
MONOCYTES NFR BLD AUTO: 14.7 %
NEUTROPHILS # BLD AUTO: 2.45 X10*3/UL (ref 1.2–7.7)
NEUTROPHILS NFR BLD AUTO: 65.8 %
NRBC BLD-RTO: ABNORMAL /100{WBCS}
PLATELET # BLD AUTO: 354 X10*3/UL (ref 150–450)
POTASSIUM SERPL-SCNC: 3.9 MMOL/L (ref 3.5–5.3)
PROT SERPL-MCNC: 6.9 G/DL (ref 6.4–8.2)
RBC # BLD AUTO: 3.43 X10*6/UL (ref 4–5.2)
SODIUM SERPL-SCNC: 138 MMOL/L (ref 136–145)
WBC # BLD AUTO: 3.7 X10*3/UL (ref 4.4–11.3)

## 2023-12-19 PROCEDURE — 3077F SYST BP >= 140 MM HG: CPT | Performed by: INTERNAL MEDICINE

## 2023-12-19 PROCEDURE — 99215 OFFICE O/P EST HI 40 MIN: CPT | Mod: 25 | Performed by: INTERNAL MEDICINE

## 2023-12-19 PROCEDURE — 96413 CHEMO IV INFUSION 1 HR: CPT

## 2023-12-19 PROCEDURE — 36415 COLL VENOUS BLD VENIPUNCTURE: CPT

## 2023-12-19 PROCEDURE — 2500000004 HC RX 250 GENERAL PHARMACY W/ HCPCS (ALT 636 FOR OP/ED): Performed by: INTERNAL MEDICINE

## 2023-12-19 PROCEDURE — 96417 CHEMO IV INFUS EACH ADDL SEQ: CPT

## 2023-12-19 PROCEDURE — 99215 OFFICE O/P EST HI 40 MIN: CPT | Performed by: INTERNAL MEDICINE

## 2023-12-19 PROCEDURE — 96367 TX/PROPH/DG ADDL SEQ IV INF: CPT

## 2023-12-19 PROCEDURE — 96375 TX/PRO/DX INJ NEW DRUG ADDON: CPT | Mod: INF

## 2023-12-19 PROCEDURE — 83615 LACTATE (LD) (LDH) ENZYME: CPT

## 2023-12-19 PROCEDURE — 85025 COMPLETE CBC W/AUTO DIFF WBC: CPT

## 2023-12-19 PROCEDURE — 96411 CHEMO IV PUSH ADDL DRUG: CPT

## 2023-12-19 PROCEDURE — 96415 CHEMO IV INFUSION ADDL HR: CPT

## 2023-12-19 PROCEDURE — 3079F DIAST BP 80-89 MM HG: CPT | Performed by: INTERNAL MEDICINE

## 2023-12-19 PROCEDURE — 1036F TOBACCO NON-USER: CPT | Performed by: INTERNAL MEDICINE

## 2023-12-19 PROCEDURE — 96409 CHEMO IV PUSH SNGL DRUG: CPT

## 2023-12-19 PROCEDURE — 80053 COMPREHEN METABOLIC PANEL: CPT

## 2023-12-19 PROCEDURE — 2500000001 HC RX 250 WO HCPCS SELF ADMINISTERED DRUGS (ALT 637 FOR MEDICARE OP): Performed by: INTERNAL MEDICINE

## 2023-12-19 PROCEDURE — 82040 ASSAY OF SERUM ALBUMIN: CPT

## 2023-12-19 RX ORDER — PALONOSETRON 0.05 MG/ML
0.25 INJECTION, SOLUTION INTRAVENOUS ONCE
Status: CANCELLED | OUTPATIENT
Start: 2024-01-09

## 2023-12-19 RX ORDER — PROCHLORPERAZINE EDISYLATE 5 MG/ML
10 INJECTION INTRAMUSCULAR; INTRAVENOUS EVERY 6 HOURS PRN
Status: CANCELLED | OUTPATIENT
Start: 2024-01-09

## 2023-12-19 RX ORDER — DOXORUBICIN HYDROCHLORIDE 2 MG/ML
50 INJECTION, SOLUTION INTRAVENOUS ONCE
Status: CANCELLED | OUTPATIENT
Start: 2024-01-09

## 2023-12-19 RX ORDER — ACETAMINOPHEN 325 MG/1
650 TABLET ORAL ONCE
Status: COMPLETED | OUTPATIENT
Start: 2023-12-19 | End: 2023-12-19

## 2023-12-19 RX ORDER — DIPHENHYDRAMINE HYDROCHLORIDE 50 MG/ML
50 INJECTION INTRAMUSCULAR; INTRAVENOUS AS NEEDED
Status: CANCELLED | OUTPATIENT
Start: 2024-01-09

## 2023-12-19 RX ORDER — EPINEPHRINE 0.3 MG/.3ML
0.3 INJECTION SUBCUTANEOUS EVERY 5 MIN PRN
Status: DISCONTINUED | OUTPATIENT
Start: 2023-12-19 | End: 2023-12-19 | Stop reason: HOSPADM

## 2023-12-19 RX ORDER — HEPARIN 100 UNIT/ML
500 SYRINGE INTRAVENOUS AS NEEDED
Status: CANCELLED | OUTPATIENT
Start: 2023-12-19

## 2023-12-19 RX ORDER — PROCHLORPERAZINE MALEATE 5 MG
10 TABLET ORAL EVERY 6 HOURS PRN
Status: CANCELLED | OUTPATIENT
Start: 2024-01-09

## 2023-12-19 RX ORDER — DIPHENHYDRAMINE HCL 25 MG
50 CAPSULE ORAL ONCE
Status: COMPLETED | OUTPATIENT
Start: 2023-12-19 | End: 2023-12-19

## 2023-12-19 RX ORDER — ALBUTEROL SULFATE 0.83 MG/ML
3 SOLUTION RESPIRATORY (INHALATION) AS NEEDED
Status: DISCONTINUED | OUTPATIENT
Start: 2023-12-19 | End: 2023-12-19 | Stop reason: HOSPADM

## 2023-12-19 RX ORDER — PROCHLORPERAZINE EDISYLATE 5 MG/ML
10 INJECTION INTRAMUSCULAR; INTRAVENOUS EVERY 6 HOURS PRN
Status: DISCONTINUED | OUTPATIENT
Start: 2023-12-19 | End: 2023-12-19 | Stop reason: HOSPADM

## 2023-12-19 RX ORDER — FAMOTIDINE 10 MG/ML
20 INJECTION INTRAVENOUS ONCE AS NEEDED
Status: DISCONTINUED | OUTPATIENT
Start: 2023-12-19 | End: 2023-12-19 | Stop reason: HOSPADM

## 2023-12-19 RX ORDER — DIPHENHYDRAMINE HYDROCHLORIDE 50 MG/ML
50 INJECTION INTRAMUSCULAR; INTRAVENOUS AS NEEDED
Status: DISCONTINUED | OUTPATIENT
Start: 2023-12-19 | End: 2023-12-19 | Stop reason: HOSPADM

## 2023-12-19 RX ORDER — ACETAMINOPHEN 325 MG/1
650 TABLET ORAL ONCE
Status: CANCELLED | OUTPATIENT
Start: 2024-01-09

## 2023-12-19 RX ORDER — FAMOTIDINE 10 MG/ML
20 INJECTION INTRAVENOUS ONCE AS NEEDED
Status: CANCELLED | OUTPATIENT
Start: 2024-01-09

## 2023-12-19 RX ORDER — DIPHENHYDRAMINE HCL 50 MG
50 CAPSULE ORAL ONCE
Status: CANCELLED | OUTPATIENT
Start: 2024-01-09

## 2023-12-19 RX ORDER — EPINEPHRINE 0.3 MG/.3ML
0.3 INJECTION SUBCUTANEOUS EVERY 5 MIN PRN
Status: CANCELLED | OUTPATIENT
Start: 2024-01-09

## 2023-12-19 RX ORDER — PALONOSETRON 0.05 MG/ML
0.25 INJECTION, SOLUTION INTRAVENOUS ONCE
Status: COMPLETED | OUTPATIENT
Start: 2023-12-19 | End: 2023-12-19

## 2023-12-19 RX ORDER — ALBUTEROL SULFATE 0.83 MG/ML
3 SOLUTION RESPIRATORY (INHALATION) AS NEEDED
Status: CANCELLED | OUTPATIENT
Start: 2024-01-09

## 2023-12-19 RX ORDER — PROCHLORPERAZINE MALEATE 10 MG
10 TABLET ORAL EVERY 6 HOURS PRN
Status: DISCONTINUED | OUTPATIENT
Start: 2023-12-19 | End: 2023-12-19 | Stop reason: HOSPADM

## 2023-12-19 RX ORDER — HEPARIN SODIUM,PORCINE/PF 10 UNIT/ML
50 SYRINGE (ML) INTRAVENOUS AS NEEDED
Status: CANCELLED | OUTPATIENT
Start: 2023-12-19

## 2023-12-19 RX ORDER — DOXORUBICIN HYDROCHLORIDE 2 MG/ML
50 INJECTION, SOLUTION INTRAVENOUS ONCE
Status: COMPLETED | OUTPATIENT
Start: 2023-12-19 | End: 2023-12-19

## 2023-12-19 RX ADMIN — CYCLOPHOSPHAMIDE 1620 MG: 1 INJECTION, POWDER, FOR SOLUTION INTRAVENOUS; ORAL at 12:48

## 2023-12-19 RX ADMIN — DOXORUBICIN HYDROCHLORIDE 108 MG: 2 INJECTION, SOLUTION INTRAVENOUS at 12:20

## 2023-12-19 RX ADMIN — DIPHENHYDRAMINE HYDROCHLORIDE 50 MG: 25 CAPSULE ORAL at 13:05

## 2023-12-19 RX ADMIN — SODIUM CHLORIDE 800 MG: 9 INJECTION, SOLUTION INTRAVENOUS at 13:30

## 2023-12-19 RX ADMIN — ACETAMINOPHEN 650 MG: 325 TABLET ORAL at 13:05

## 2023-12-19 RX ADMIN — PALONOSETRON 250 MCG: 0.05 INJECTION, SOLUTION INTRAVENOUS at 11:31

## 2023-12-19 RX ADMIN — FOSAPREPITANT DIMEGLUMINE 150 MG: 150 INJECTION, POWDER, LYOPHILIZED, FOR SOLUTION INTRAVENOUS at 11:36

## 2023-12-19 RX ADMIN — PROCHLORPERAZINE EDISYLATE 10 MG: 5 INJECTION INTRAMUSCULAR; INTRAVENOUS at 10:32

## 2023-12-19 ASSESSMENT — PAIN SCALES - GENERAL: PAINLEVEL: 0-NO PAIN

## 2023-12-19 NOTE — PROGRESS NOTES
Patient ID: Alejandra Jimenes is a 53 y.o. female.    Subjective    Chief Complaint: NLPHL  Interval History:   Patient with newly diagnosed nodular lymphocyte predominant Hodgkin's Lymphoma is here for further discussion and management.     Patient was seen for chronic back pain and MRI done on 7/11/23 showed DJD and worsening retroperitoneal LN's. CT/PET on 7/17/23 confirmed FDG avid right inguinal, ileac and retroperitoneal LN's. She was seen by Surg onc and excisional right inguinal LN  biopsy was performed on 8/14/23. 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  She is here with her  and her sister.      9/19/23: here for cycle #1 R-CHOP. discussed benefits/risks. consent obtained. doing well. No new c/o  10/31/23: due for cycle #3. C/o mouth soreness for ~ 9 days after chemo. No recent f/c/n/v/d. Some constipation  11/28/23: here for Cycle#4. C/o nausea for 4-5 days after chemo. Now takes zofran prn  12/19/23: here for cycle #5. C/o numbing both hands(hx CTS-> s/p surgery)     ROS: negative except in HPI     PMHx: chronic LBP with herniated disc at L5-S1 causing sciatica down her right leg.  PSHX; c-sction     FHx; Mother with breast cancer, father with colon cancer, her maternal aunt with uterine ca and breat ca.        Allergies and Intolerances:       Allergies:         hydrocodone: Drug, Rash, Active     Outpatient Medication Profile:  * Patient Currently Takes Medications as of 20-Sep-2023 08:09 documented in Structured Notes         predniSONE 50 mg oral tablet : 2 tab(s) orally once a day , Start Date: 19-Sep-2023         amLODIPine 5 mg oral tablet: 1 tab(s) orally once a day , Start Date: 11-Feb-2019         gabapentin 300 mg oral capsule: 1 cap(s) orally 3 times a day, As Needed         meclizine 25 mg oral tablet: 1 tab(s) orally 3 times a day, As Needed         CeleBREX 100 mg oral capsule: 1 cap(s) orally 2 times a day, As Needed-  holding for  procedure          metFORMIN 500 mg oral tablet: 1 tab(s) orally once a day         multivitamin: 1   once a day -holding for procedure         Flexeril 10 mg oral tablet: 1 tab(s) orally once a day (at bedtime),  As Needed         Wegovy (2.4 mg dose) subcutaneous solution: subcutaneous once a week-  Wednesdays         clobetasol 0.05% topical ointment: Apply topically to affected area 2  times a day, As Needed         ciclopirox 0.77% topical lotion: Apply topically to affected area 2 times  a day, As Needed          Family History: No Family History items are recorded  in the problem list.     Social History:   Social Substance History:    ·  Smoking Status never smoker (1)  ·  Additional History    lives with , works at [employer] as a (1)        Objective    BSA: 2.09 meters squared  /89 (BP Location: Right arm, Patient Position: Sitting)   Pulse 84   Temp 35.9 °C (96.6 °F) (Temporal)   Resp 16   Wt 94.3 kg (207 lb 14.3 oz)   SpO2 98%   BMI 34.14 kg/m²      Physical Exam  Constitutional:       Appearance: Normal appearance.   HENT:      Head: Normocephalic and atraumatic.      Mouth/Throat:      Mouth: Mucous membranes are moist.      Pharynx: Oropharynx is clear.   Eyes:      Extraocular Movements: Extraocular movements intact.      Pupils: Pupils are equal, round, and reactive to light.   Cardiovascular:      Rate and Rhythm: Normal rate and regular rhythm.      Heart sounds: No murmur heard.  Pulmonary:      Effort: Pulmonary effort is normal.      Breath sounds: Normal breath sounds.   Abdominal:      General: Abdomen is flat. Bowel sounds are normal.      Palpations: Abdomen is soft.   Musculoskeletal:         General: Normal range of motion.      Cervical back: Normal range of motion and neck supple.      Right lower leg: No edema.      Left lower leg: No edema.   Skin:     General: Skin is warm and dry.   Neurological:      General: No focal deficit present.      Mental  Status: She is alert.   Psychiatric:         Mood and Affect: Mood normal.         Behavior: Behavior normal.         Judgment: Judgment normal.         Performance Status:  Asymptomatic      Assessment/Plan   Assessment:   Patient with newly diagnosed nodular lymphocyte predominant Hodgkin's Lymphoma is here for further discussion and management.    Plan:   Nodular Lymphocyte predominant B cell Lymphoma(Nodular Lymphocyte predominant Hodgkin's Lymphoma: NLPHL) stage II  - discussed lymphoma in general, diagnosis, prognosis and treatment options and they understood well.  - recommended R-CHOP for 6 cycles.  - echocadiogram: normal EF  - LDH, CBC,CMP, hepatitis panel reviewed with patient.  - R-CHOP on 9/20/23, 10/10 due for C3 today 10/31  - Long acting neupogen denies based on NCCN guideline per insurance company.  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  - skip Vincristine for cycle 5 and C6      LBP  - cont current meds     RTC 3 weeks for C6         Cancer Staging   No matching staging information was found for the patient.    Oncology History   Nodular lymphocyte predominant Hodgkin lymphoma (CMS/HCC)   9/20/2023 -  Chemotherapy    R-CHOP (Cyclophosphamide / DOXOrubicin / VinCRIStine / PredniSONE) + RiTUXimab, 21 Day Cycles     9/27/2023 Initial Diagnosis    Nodular lymphocyte predominant Hodgkin lymphoma (CMS/HCC)            Erlinda Boyd MD

## 2024-01-09 ENCOUNTER — APPOINTMENT (OUTPATIENT)
Dept: HEMATOLOGY/ONCOLOGY | Facility: CLINIC | Age: 54
End: 2024-01-09
Payer: COMMERCIAL

## 2024-01-23 ENCOUNTER — OFFICE VISIT (OUTPATIENT)
Dept: HEMATOLOGY/ONCOLOGY | Facility: CLINIC | Age: 54
End: 2024-01-23
Payer: COMMERCIAL

## 2024-01-23 ENCOUNTER — INFUSION (OUTPATIENT)
Dept: HEMATOLOGY/ONCOLOGY | Facility: CLINIC | Age: 54
End: 2024-01-23
Payer: COMMERCIAL

## 2024-01-23 VITALS
HEART RATE: 57 BPM | BODY MASS INDEX: 33.82 KG/M2 | OXYGEN SATURATION: 99 % | DIASTOLIC BLOOD PRESSURE: 77 MMHG | TEMPERATURE: 96.4 F | WEIGHT: 203 LBS | SYSTOLIC BLOOD PRESSURE: 113 MMHG | RESPIRATION RATE: 16 BRPM | HEIGHT: 65 IN

## 2024-01-23 DIAGNOSIS — C81.00 NODULAR LYMPHOCYTE PREDOMINANT HODGKIN LYMPHOMA, UNSPECIFIED BODY REGION (MULTI): ICD-10-CM

## 2024-01-23 LAB
ALBUMIN SERPL BCP-MCNC: 4.1 G/DL (ref 3.4–5)
ALP SERPL-CCNC: 75 U/L (ref 33–110)
ALT SERPL W P-5'-P-CCNC: 10 U/L (ref 7–45)
ANION GAP SERPL CALC-SCNC: 16 MMOL/L (ref 10–20)
AST SERPL W P-5'-P-CCNC: 21 U/L (ref 9–39)
BASOPHILS # BLD AUTO: 0.01 X10*3/UL (ref 0–0.1)
BASOPHILS NFR BLD AUTO: 0.2 %
BILIRUB SERPL-MCNC: 0.8 MG/DL (ref 0–1.2)
BUN SERPL-MCNC: 20 MG/DL (ref 6–23)
CALCIUM SERPL-MCNC: 9.6 MG/DL (ref 8.6–10.3)
CHLORIDE SERPL-SCNC: 101 MMOL/L (ref 98–107)
CO2 SERPL-SCNC: 28 MMOL/L (ref 21–32)
CREAT SERPL-MCNC: 0.93 MG/DL (ref 0.5–1.05)
EGFRCR SERPLBLD CKD-EPI 2021: 74 ML/MIN/1.73M*2
EOSINOPHIL # BLD AUTO: 0.49 X10*3/UL (ref 0–0.7)
EOSINOPHIL NFR BLD AUTO: 11.2 %
ERYTHROCYTE [DISTWIDTH] IN BLOOD BY AUTOMATED COUNT: 16.5 % (ref 11.5–14.5)
GLUCOSE SERPL-MCNC: 84 MG/DL (ref 74–99)
HCT VFR BLD AUTO: 31.9 % (ref 36–46)
HGB BLD-MCNC: 9.9 G/DL (ref 12–16)
IMM GRANULOCYTES # BLD AUTO: 0.01 X10*3/UL (ref 0–0.7)
IMM GRANULOCYTES NFR BLD AUTO: 0.2 % (ref 0–0.9)
LDH SERPL L TO P-CCNC: 240 U/L (ref 84–246)
LYMPHOCYTES # BLD AUTO: 0.74 X10*3/UL (ref 1.2–4.8)
LYMPHOCYTES NFR BLD AUTO: 16.9 %
MCH RBC QN AUTO: 28.9 PG (ref 26–34)
MCHC RBC AUTO-ENTMCNC: 31 G/DL (ref 32–36)
MCV RBC AUTO: 93 FL (ref 80–100)
MONOCYTES # BLD AUTO: 0.62 X10*3/UL (ref 0.1–1)
MONOCYTES NFR BLD AUTO: 14.2 %
NEUTROPHILS # BLD AUTO: 2.5 X10*3/UL (ref 1.2–7.7)
NEUTROPHILS NFR BLD AUTO: 57.3 %
NRBC BLD-RTO: ABNORMAL /100{WBCS}
PLATELET # BLD AUTO: 237 X10*3/UL (ref 150–450)
POTASSIUM SERPL-SCNC: 4.6 MMOL/L (ref 3.5–5.3)
PROT SERPL-MCNC: 7.1 G/DL (ref 6.4–8.2)
RBC # BLD AUTO: 3.43 X10*6/UL (ref 4–5.2)
SODIUM SERPL-SCNC: 140 MMOL/L (ref 136–145)
WBC # BLD AUTO: 4.4 X10*3/UL (ref 4.4–11.3)

## 2024-01-23 PROCEDURE — 96375 TX/PRO/DX INJ NEW DRUG ADDON: CPT | Mod: INF

## 2024-01-23 PROCEDURE — 96415 CHEMO IV INFUSION ADDL HR: CPT

## 2024-01-23 PROCEDURE — 2500000001 HC RX 250 WO HCPCS SELF ADMINISTERED DRUGS (ALT 637 FOR MEDICARE OP): Performed by: INTERNAL MEDICINE

## 2024-01-23 PROCEDURE — 2500000004 HC RX 250 GENERAL PHARMACY W/ HCPCS (ALT 636 FOR OP/ED): Performed by: INTERNAL MEDICINE

## 2024-01-23 PROCEDURE — 2500000004 HC RX 250 GENERAL PHARMACY W/ HCPCS (ALT 636 FOR OP/ED): Mod: JZ | Performed by: INTERNAL MEDICINE

## 2024-01-23 PROCEDURE — 96411 CHEMO IV PUSH ADDL DRUG: CPT

## 2024-01-23 PROCEDURE — 85025 COMPLETE CBC W/AUTO DIFF WBC: CPT

## 2024-01-23 PROCEDURE — 1036F TOBACCO NON-USER: CPT | Performed by: INTERNAL MEDICINE

## 2024-01-23 PROCEDURE — 99215 OFFICE O/P EST HI 40 MIN: CPT | Performed by: INTERNAL MEDICINE

## 2024-01-23 PROCEDURE — 96417 CHEMO IV INFUS EACH ADDL SEQ: CPT

## 2024-01-23 PROCEDURE — 80053 COMPREHEN METABOLIC PANEL: CPT

## 2024-01-23 PROCEDURE — 96367 TX/PROPH/DG ADDL SEQ IV INF: CPT

## 2024-01-23 PROCEDURE — 83615 LACTATE (LD) (LDH) ENZYME: CPT

## 2024-01-23 PROCEDURE — 96413 CHEMO IV INFUSION 1 HR: CPT

## 2024-01-23 RX ORDER — HEPARIN 100 UNIT/ML
500 SYRINGE INTRAVENOUS AS NEEDED
OUTPATIENT
Start: 2024-01-23

## 2024-01-23 RX ORDER — EPINEPHRINE 0.3 MG/.3ML
0.3 INJECTION SUBCUTANEOUS EVERY 5 MIN PRN
Status: DISCONTINUED | OUTPATIENT
Start: 2024-01-23 | End: 2024-01-23 | Stop reason: HOSPADM

## 2024-01-23 RX ORDER — PROCHLORPERAZINE EDISYLATE 5 MG/ML
10 INJECTION INTRAMUSCULAR; INTRAVENOUS EVERY 6 HOURS PRN
Status: DISCONTINUED | OUTPATIENT
Start: 2024-01-23 | End: 2024-01-23 | Stop reason: HOSPADM

## 2024-01-23 RX ORDER — PROCHLORPERAZINE MALEATE 10 MG
10 TABLET ORAL EVERY 6 HOURS PRN
Status: DISCONTINUED | OUTPATIENT
Start: 2024-01-23 | End: 2024-01-23 | Stop reason: HOSPADM

## 2024-01-23 RX ORDER — ACETAMINOPHEN 325 MG/1
650 TABLET ORAL ONCE
Status: COMPLETED | OUTPATIENT
Start: 2024-01-23 | End: 2024-01-23

## 2024-01-23 RX ORDER — HEPARIN SODIUM,PORCINE/PF 10 UNIT/ML
50 SYRINGE (ML) INTRAVENOUS AS NEEDED
OUTPATIENT
Start: 2024-01-23

## 2024-01-23 RX ORDER — DOXORUBICIN HYDROCHLORIDE 2 MG/ML
50 INJECTION, SOLUTION INTRAVENOUS ONCE
Status: COMPLETED | OUTPATIENT
Start: 2024-01-23 | End: 2024-01-23

## 2024-01-23 RX ORDER — PALONOSETRON 0.05 MG/ML
0.25 INJECTION, SOLUTION INTRAVENOUS ONCE
Status: COMPLETED | OUTPATIENT
Start: 2024-01-23 | End: 2024-01-23

## 2024-01-23 RX ORDER — FAMOTIDINE 10 MG/ML
20 INJECTION INTRAVENOUS ONCE AS NEEDED
Status: DISCONTINUED | OUTPATIENT
Start: 2024-01-23 | End: 2024-01-23 | Stop reason: HOSPADM

## 2024-01-23 RX ORDER — ALBUTEROL SULFATE 0.83 MG/ML
3 SOLUTION RESPIRATORY (INHALATION) AS NEEDED
Status: DISCONTINUED | OUTPATIENT
Start: 2024-01-23 | End: 2024-01-23 | Stop reason: HOSPADM

## 2024-01-23 RX ORDER — DIPHENHYDRAMINE HYDROCHLORIDE 50 MG/ML
50 INJECTION INTRAMUSCULAR; INTRAVENOUS AS NEEDED
Status: DISCONTINUED | OUTPATIENT
Start: 2024-01-23 | End: 2024-01-23 | Stop reason: HOSPADM

## 2024-01-23 RX ORDER — DIPHENHYDRAMINE HCL 25 MG
50 CAPSULE ORAL ONCE
Status: COMPLETED | OUTPATIENT
Start: 2024-01-23 | End: 2024-01-23

## 2024-01-23 RX ADMIN — DOXORUBICIN HYDROCHLORIDE 108 MG: 2 INJECTION, SOLUTION INTRAVENOUS at 10:27

## 2024-01-23 RX ADMIN — SODIUM CHLORIDE 800 MG: 9 INJECTION, SOLUTION INTRAVENOUS at 11:35

## 2024-01-23 RX ADMIN — PROCHLORPERAZINE EDISYLATE 10 MG: 5 INJECTION INTRAMUSCULAR; INTRAVENOUS at 09:36

## 2024-01-23 RX ADMIN — PALONOSETRON 250 MCG: 0.05 INJECTION, SOLUTION INTRAVENOUS at 09:35

## 2024-01-23 RX ADMIN — DIPHENHYDRAMINE HYDROCHLORIDE 50 MG: 25 CAPSULE ORAL at 10:27

## 2024-01-23 RX ADMIN — ACETAMINOPHEN 650 MG: 325 TABLET ORAL at 10:27

## 2024-01-23 RX ADMIN — CYCLOPHOSPHAMIDE 1620 MG: 1 INJECTION, POWDER, FOR SOLUTION INTRAVENOUS; ORAL at 10:58

## 2024-01-23 RX ADMIN — FOSAPREPITANT DIMEGLUMINE 150 MG: 150 INJECTION, POWDER, LYOPHILIZED, FOR SOLUTION INTRAVENOUS at 09:54

## 2024-01-23 ASSESSMENT — PAIN SCALES - GENERAL: PAINLEVEL: 0-NO PAIN

## 2024-01-24 NOTE — PROGRESS NOTES
Patient ID: Alejandra Jimenes is a 53 y.o. female.    Subjective    Chief Complaint: NLPHL  Interval History:   Patient with newly diagnosed nodular lymphocyte predominant Hodgkin's Lymphoma is here for further discussion and management.     Patient was seen for chronic back pain and MRI done on 7/11/23 showed DJD and worsening retroperitoneal LN's. CT/PET on 7/17/23 confirmed FDG avid right inguinal, ileac and retroperitoneal LN's. She was seen by Surg onc and excisional right inguinal LN  biopsy was performed on 8/14/23. 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  She is here with her  and her sister.      9/19/23: here for cycle #1 R-CHOP. discussed benefits/risks. consent obtained. doing well. No new c/o  10/31/23: due for cycle #3. C/o mouth soreness for ~ 9 days after chemo. No recent f/c/n/v/d. Some constipation  11/28/23: here for Cycle#4. C/o nausea for 4-5 days after chemo. Now takes zofran prn  12/19/23: here for cycle #5. C/o numbing both hands(hx CTS-> s/p surgery)  1/23/24: here for cycle #6 No new c/o     ROS: negative except in HPI     PMHx: chronic LBP with herniated disc at L5-S1 causing sciatica down her right leg.  PSHX; c-sction     FHx; Mother with breast cancer, father with colon cancer, her maternal aunt with uterine ca and breat ca.     Social History:   Social Substance History:    ·  Smoking Status never smoker (1)  ·  Additional History    lives with , works at post office as a (1)        Objective    BSA: There is no height or weight on file to calculate BSA.  There were no vitals taken for this visit.     Physical Exam  Constitutional:       Appearance: Normal appearance.   HENT:      Head: Normocephalic and atraumatic.      Mouth/Throat:      Mouth: Mucous membranes are moist.      Pharynx: Oropharynx is clear.   Eyes:      Extraocular Movements: Extraocular movements intact.      Pupils: Pupils are equal,  round, and reactive to light.   Cardiovascular:      Rate and Rhythm: Normal rate and regular rhythm.      Heart sounds: No murmur heard.  Pulmonary:      Effort: Pulmonary effort is normal.      Breath sounds: Normal breath sounds.   Abdominal:      General: Abdomen is flat. Bowel sounds are normal.      Palpations: Abdomen is soft.   Musculoskeletal:         General: Normal range of motion.      Cervical back: Normal range of motion and neck supple.      Right lower leg: No edema.      Left lower leg: No edema.   Skin:     General: Skin is warm and dry.   Neurological:      General: No focal deficit present.      Mental Status: She is alert.   Psychiatric:         Mood and Affect: Mood normal.         Behavior: Behavior normal.         Judgment: Judgment normal.         Performance Status:  Asymptomatic      Assessment/Plan   Assessment:   Patient with newly diagnosed nodular lymphocyte predominant Hodgkin's Lymphoma is here for further discussion and management.    Plan:   Nodular Lymphocyte predominant B cell Lymphoma(Nodular Lymphocyte predominant Hodgkin's Lymphoma: NLPHL) stage II  - discussed lymphoma in general, diagnosis, prognosis and treatment options and they understood well.  - recommended R-CHOP for 6 cycles.  - echocadiogram: normal EF  - LDH, CBC,CMP, hepatitis panel reviewed with patient.  - R-CHOP on 9/20/23, 10/10, 10/31, 11/28, 12/19  - Long acting neupogen denies based on NCCN guideline per insurance company.  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  - skip Vincristine for cycle 5 and C6      LBP  - cont current meds     RTC 6 weeks.  PET prior to next visit.         Cancer Staging   No matching staging information was found for the patient.    Oncology History   Nodular  lymphocyte predominant Hodgkin lymphoma (CMS/HCC)   9/20/2023 -  Chemotherapy    R-CHOP (Cyclophosphamide / DOXOrubicin / VinCRIStine / PredniSONE) + RiTUXimab, 21 Day Cycles     9/27/2023 Initial Diagnosis    Nodular lymphocyte predominant Hodgkin lymphoma (CMS/HCC)            Erlinda Boyd MD

## 2024-02-23 ENCOUNTER — HOSPITAL ENCOUNTER (OUTPATIENT)
Dept: RADIOLOGY | Facility: CLINIC | Age: 54
Discharge: HOME | End: 2024-02-23
Payer: COMMERCIAL

## 2024-02-23 DIAGNOSIS — C81.00 NODULAR LYMPHOCYTE PREDOMINANT HODGKIN LYMPHOMA, UNSPECIFIED BODY REGION (MULTI): ICD-10-CM

## 2024-02-23 PROCEDURE — 78816 PET IMAGE W/CT FULL BODY: CPT | Mod: PET TUMOR SUBSQ TX STRATEGY | Performed by: RADIOLOGY

## 2024-02-23 PROCEDURE — 78815 PET IMAGE W/CT SKULL-THIGH: CPT | Mod: PS

## 2024-02-23 PROCEDURE — 3430000001 HC RX 343 DIAGNOSTIC RADIOPHARMACEUTICALS: Performed by: INTERNAL MEDICINE

## 2024-02-23 PROCEDURE — A9552 F18 FDG: HCPCS | Performed by: INTERNAL MEDICINE

## 2024-02-23 RX ORDER — FLUDEOXYGLUCOSE F 18 200 MCI/ML
9.6 INJECTION, SOLUTION INTRAVENOUS
Status: COMPLETED | OUTPATIENT
Start: 2024-02-23 | End: 2024-02-23

## 2024-02-23 RX ADMIN — FLUDEOXYGLUCOSE F 18 9.6 MILLICURIE: 200 INJECTION, SOLUTION INTRAVENOUS at 11:08

## 2024-03-19 ENCOUNTER — APPOINTMENT (OUTPATIENT)
Dept: HEMATOLOGY/ONCOLOGY | Facility: HOSPITAL | Age: 54
End: 2024-03-19
Payer: COMMERCIAL

## 2024-03-26 ENCOUNTER — APPOINTMENT (OUTPATIENT)
Dept: HEMATOLOGY/ONCOLOGY | Facility: HOSPITAL | Age: 54
End: 2024-03-26
Payer: COMMERCIAL

## 2024-04-02 ENCOUNTER — OFFICE VISIT (OUTPATIENT)
Dept: HEMATOLOGY/ONCOLOGY | Facility: HOSPITAL | Age: 54
End: 2024-04-02
Payer: COMMERCIAL

## 2024-04-02 ENCOUNTER — LAB (OUTPATIENT)
Dept: LAB | Facility: HOSPITAL | Age: 54
End: 2024-04-02
Payer: COMMERCIAL

## 2024-04-02 VITALS
TEMPERATURE: 97.3 F | SYSTOLIC BLOOD PRESSURE: 132 MMHG | HEART RATE: 94 BPM | OXYGEN SATURATION: 100 % | RESPIRATION RATE: 17 BRPM | WEIGHT: 206.57 LBS | DIASTOLIC BLOOD PRESSURE: 87 MMHG | BODY MASS INDEX: 33.92 KG/M2

## 2024-04-02 DIAGNOSIS — C81.00 NODULAR LYMPHOCYTE PREDOMINANT HODGKIN LYMPHOMA, UNSPECIFIED BODY REGION (MULTI): Primary | ICD-10-CM

## 2024-04-02 DIAGNOSIS — C81.00 NODULAR LYMPHOCYTE PREDOMINANT HODGKIN LYMPHOMA, UNSPECIFIED BODY REGION (MULTI): ICD-10-CM

## 2024-04-02 LAB
ALBUMIN SERPL BCP-MCNC: 4.1 G/DL (ref 3.4–5)
ALP SERPL-CCNC: 86 U/L (ref 33–110)
ALT SERPL W P-5'-P-CCNC: 9 U/L (ref 7–45)
ANION GAP SERPL CALC-SCNC: 14 MMOL/L (ref 10–20)
AST SERPL W P-5'-P-CCNC: 13 U/L (ref 9–39)
BASOPHILS # BLD AUTO: 0.02 X10*3/UL (ref 0–0.1)
BASOPHILS NFR BLD AUTO: 0.5 %
BILIRUB SERPL-MCNC: 0.8 MG/DL (ref 0–1.2)
BUN SERPL-MCNC: 15 MG/DL (ref 6–23)
CALCIUM SERPL-MCNC: 9.6 MG/DL (ref 8.6–10.3)
CHLORIDE SERPL-SCNC: 102 MMOL/L (ref 98–107)
CO2 SERPL-SCNC: 28 MMOL/L (ref 21–32)
CREAT SERPL-MCNC: 0.76 MG/DL (ref 0.5–1.05)
EGFRCR SERPLBLD CKD-EPI 2021: >90 ML/MIN/1.73M*2
EOSINOPHIL # BLD AUTO: 0.27 X10*3/UL (ref 0–0.7)
EOSINOPHIL NFR BLD AUTO: 6.7 %
ERYTHROCYTE [DISTWIDTH] IN BLOOD BY AUTOMATED COUNT: 16.8 % (ref 11.5–14.5)
GLUCOSE SERPL-MCNC: 71 MG/DL (ref 74–99)
HCT VFR BLD AUTO: 32.8 % (ref 36–46)
HGB BLD-MCNC: 10.5 G/DL (ref 12–16)
IMM GRANULOCYTES # BLD AUTO: 0.01 X10*3/UL (ref 0–0.7)
IMM GRANULOCYTES NFR BLD AUTO: 0.2 % (ref 0–0.9)
LDH SERPL L TO P-CCNC: 139 U/L (ref 84–246)
LYMPHOCYTES # BLD AUTO: 0.7 X10*3/UL (ref 1.2–4.8)
LYMPHOCYTES NFR BLD AUTO: 17.3 %
MCH RBC QN AUTO: 29 PG (ref 26–34)
MCHC RBC AUTO-ENTMCNC: 32 G/DL (ref 32–36)
MCV RBC AUTO: 91 FL (ref 80–100)
MONOCYTES # BLD AUTO: 0.37 X10*3/UL (ref 0.1–1)
MONOCYTES NFR BLD AUTO: 9.2 %
NEUTROPHILS # BLD AUTO: 2.67 X10*3/UL (ref 1.2–7.7)
NEUTROPHILS NFR BLD AUTO: 66.1 %
NRBC BLD-RTO: 0 /100 WBCS (ref 0–0)
PLATELET # BLD AUTO: 247 X10*3/UL (ref 150–450)
POTASSIUM SERPL-SCNC: 3.6 MMOL/L (ref 3.5–5.3)
PROT SERPL-MCNC: 7.2 G/DL (ref 6.4–8.2)
RBC # BLD AUTO: 3.62 X10*6/UL (ref 4–5.2)
SODIUM SERPL-SCNC: 140 MMOL/L (ref 136–145)
WBC # BLD AUTO: 4 X10*3/UL (ref 4.4–11.3)

## 2024-04-02 PROCEDURE — 83615 LACTATE (LD) (LDH) ENZYME: CPT

## 2024-04-02 PROCEDURE — 99215 OFFICE O/P EST HI 40 MIN: CPT | Performed by: INTERNAL MEDICINE

## 2024-04-02 PROCEDURE — 3079F DIAST BP 80-89 MM HG: CPT | Performed by: INTERNAL MEDICINE

## 2024-04-02 PROCEDURE — 85025 COMPLETE CBC W/AUTO DIFF WBC: CPT

## 2024-04-02 PROCEDURE — 84132 ASSAY OF SERUM POTASSIUM: CPT

## 2024-04-02 PROCEDURE — 36415 COLL VENOUS BLD VENIPUNCTURE: CPT

## 2024-04-02 PROCEDURE — 1036F TOBACCO NON-USER: CPT | Performed by: INTERNAL MEDICINE

## 2024-04-02 PROCEDURE — 3075F SYST BP GE 130 - 139MM HG: CPT | Performed by: INTERNAL MEDICINE

## 2024-04-02 ASSESSMENT — PAIN SCALES - GENERAL: PAINLEVEL: 0-NO PAIN

## 2024-04-02 NOTE — PROGRESS NOTES
Patient ID: Alejandra Rascon is a 53 y.o. female.    Subjective    Chief Complaint: NLPHL  Interval History:   Patient with newly diagnosed nodular lymphocyte predominant Hodgkin's Lymphoma is here for further discussion and management.     Patient was seen for chronic back pain and MRI done on 7/11/23 showed DJD and worsening retroperitoneal LN's. CT/PET on 7/17/23 confirmed FDG avid right inguinal, ileac and retroperitoneal LN's. She was seen by Surg onc and excisional right inguinal LN  biopsy was performed on 8/14/23. 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  She is here with her  and her sister.      9/19/23: here for cycle #1 R-CHOP. discussed benefits/risks. consent obtained. doing well. No new c/o  10/31/23: due for cycle #3. C/o mouth soreness for ~ 9 days after chemo. No recent f/c/n/v/d. Some constipation  11/28/23: here for Cycle#4. C/o nausea for 4-5 days after chemo. Now takes zofran prn  12/19/23: here for cycle #5. C/o numbing both hands(hx CTS-> s/p surgery)  1/23/24: here for cycle #6 No new c/o  4/2/24: doing well. Had PET scan.      ROS: negative except in HPI     PMHx: chronic LBP with herniated disc at L5-S1 causing sciatica down her right leg.  PSHX; c-sction     FHx; Mother with breast cancer, father with colon cancer, her maternal aunt with uterine ca and breat ca.     Social History:   Social Substance History:    ·  Smoking Status never smoker (1)  ·  Additional History    lives with , works at post office as a (1)        Objective    BSA: 2.08 meters squared  /87   Pulse 94   Temp 36.3 °C (97.3 °F)   Resp 17   Wt 93.7 kg (206 lb 9.1 oz)   LMP 11/20/2019   SpO2 100%   BMI 33.92 kg/m²      Physical Exam  Constitutional:       Appearance: Normal appearance.   HENT:      Head: Normocephalic and atraumatic.      Mouth/Throat:      Mouth: Mucous membranes are moist.      Pharynx: Oropharynx is  clear.   Eyes:      Extraocular Movements: Extraocular movements intact.      Pupils: Pupils are equal, round, and reactive to light.   Cardiovascular:      Rate and Rhythm: Normal rate and regular rhythm.      Heart sounds: No murmur heard.  Pulmonary:      Effort: Pulmonary effort is normal.      Breath sounds: Normal breath sounds.   Abdominal:      General: Abdomen is flat. Bowel sounds are normal.      Palpations: Abdomen is soft.   Musculoskeletal:         General: Normal range of motion.      Cervical back: Normal range of motion and neck supple.      Right lower leg: No edema.      Left lower leg: No edema.   Skin:     General: Skin is warm and dry.   Neurological:      General: No focal deficit present.      Mental Status: She is alert.   Psychiatric:         Mood and Affect: Mood normal.         Behavior: Behavior normal.         Judgment: Judgment normal.       Performance Status:  Asymptomatic      Assessment/Plan   Assessment:   Patient with newly diagnosed nodular lymphocyte predominant Hodgkin's Lymphoma is here for further discussion and management.    Plan:   Nodular Lymphocyte predominant B cell Lymphoma(Nodular Lymphocyte predominant Hodgkin's Lymphoma: NLPHL) stage II  - discussed lymphoma in general, diagnosis, prognosis and treatment options and they understood well.  - recommended R-CHOP for 6 cycles.  - echocadiogram: normal EF  - LDH, CBC,CMP, hepatitis panel reviewed with patient.  - R-CHOP on 9/20/23, 10/10, 10/31, 11/28, 12/19  - Long acting neupogen denies based on NCCN guideline per insurance company.  - continue to use mouth wash as recommended for mucositis  - will monitor CBC closely  - PET scan after C4 showed CR  - stop vincristine due to neuropathy  - PET scan c/w BETTE  - RTC 3 months.  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  - skip Vincristine for cycle 5 and C6      LBP  - cont current meds    HTN    Fhx  heart disease     RTC 3 months.           Cancer Staging   No matching staging information was found for the patient.    Oncology History   Nodular lymphocyte predominant Hodgkin lymphoma (CMS/HCC)   9/20/2023 -  Chemotherapy    R-CHOP (Cyclophosphamide / DOXOrubicin / VinCRIStine / PredniSONE) + RiTUXimab, 21 Day Cycles     9/27/2023 Initial Diagnosis    Nodular lymphocyte predominant Hodgkin lymphoma (CMS/HCC)            Erlinda Boyd MD

## 2024-05-22 ENCOUNTER — PHARMACY VISIT (OUTPATIENT)
Dept: PHARMACY | Facility: CLINIC | Age: 54
End: 2024-05-22
Payer: MEDICARE

## 2024-05-22 PROCEDURE — RXMED WILLOW AMBULATORY MEDICATION CHARGE

## 2024-05-22 RX ORDER — TIRZEPATIDE 5 MG/.5ML
INJECTION, SOLUTION SUBCUTANEOUS
Qty: 2 ML | Refills: 0 | OUTPATIENT
Start: 2024-05-21

## 2024-07-09 ENCOUNTER — APPOINTMENT (OUTPATIENT)
Dept: HEMATOLOGY/ONCOLOGY | Facility: HOSPITAL | Age: 54
End: 2024-07-09
Payer: COMMERCIAL

## 2024-07-11 ENCOUNTER — APPOINTMENT (OUTPATIENT)
Dept: HEMATOLOGY/ONCOLOGY | Facility: HOSPITAL | Age: 54
End: 2024-07-11
Payer: COMMERCIAL

## 2024-07-16 ENCOUNTER — OFFICE VISIT (OUTPATIENT)
Dept: HEMATOLOGY/ONCOLOGY | Facility: HOSPITAL | Age: 54
End: 2024-07-16
Payer: COMMERCIAL

## 2024-07-16 ENCOUNTER — LAB (OUTPATIENT)
Dept: LAB | Facility: HOSPITAL | Age: 54
End: 2024-07-16
Payer: COMMERCIAL

## 2024-07-16 ENCOUNTER — HOSPITAL ENCOUNTER (OUTPATIENT)
Dept: RADIOLOGY | Facility: HOSPITAL | Age: 54
Discharge: HOME | End: 2024-07-16
Payer: COMMERCIAL

## 2024-07-16 VITALS — BODY MASS INDEX: 31.34 KG/M2 | HEIGHT: 66 IN | WEIGHT: 195 LBS

## 2024-07-16 VITALS
RESPIRATION RATE: 17 BRPM | OXYGEN SATURATION: 100 % | BODY MASS INDEX: 32.2 KG/M2 | SYSTOLIC BLOOD PRESSURE: 134 MMHG | HEART RATE: 79 BPM | DIASTOLIC BLOOD PRESSURE: 92 MMHG | TEMPERATURE: 98.1 F | WEIGHT: 199.52 LBS

## 2024-07-16 DIAGNOSIS — C81.00 NODULAR LYMPHOCYTE PREDOMINANT HODGKIN LYMPHOMA, UNSPECIFIED BODY REGION (MULTI): Primary | ICD-10-CM

## 2024-07-16 DIAGNOSIS — C81.00 NODULAR LYMPHOCYTE PREDOMINANT HODGKIN LYMPHOMA, UNSPECIFIED BODY REGION (MULTI): ICD-10-CM

## 2024-07-16 DIAGNOSIS — Z12.31 SCREENING MAMMOGRAM FOR BREAST CANCER: ICD-10-CM

## 2024-07-16 LAB
ALBUMIN SERPL BCP-MCNC: 4.3 G/DL (ref 3.4–5)
ALP SERPL-CCNC: 85 U/L (ref 33–110)
ALT SERPL W P-5'-P-CCNC: 13 U/L (ref 7–45)
ANION GAP SERPL CALC-SCNC: 15 MMOL/L (ref 10–20)
AST SERPL W P-5'-P-CCNC: 15 U/L (ref 9–39)
BASOPHILS # BLD AUTO: 0.02 X10*3/UL (ref 0–0.1)
BASOPHILS NFR BLD AUTO: 0.5 %
BILIRUB SERPL-MCNC: 0.9 MG/DL (ref 0–1.2)
BUN SERPL-MCNC: 20 MG/DL (ref 6–23)
CALCIUM SERPL-MCNC: 9.7 MG/DL (ref 8.6–10.3)
CHLORIDE SERPL-SCNC: 101 MMOL/L (ref 98–107)
CO2 SERPL-SCNC: 26 MMOL/L (ref 21–32)
CREAT SERPL-MCNC: 0.74 MG/DL (ref 0.5–1.05)
EGFRCR SERPLBLD CKD-EPI 2021: >90 ML/MIN/1.73M*2
EOSINOPHIL # BLD AUTO: 0.23 X10*3/UL (ref 0–0.7)
EOSINOPHIL NFR BLD AUTO: 5.6 %
ERYTHROCYTE [DISTWIDTH] IN BLOOD BY AUTOMATED COUNT: 14.7 % (ref 11.5–14.5)
GLUCOSE SERPL-MCNC: 88 MG/DL (ref 74–99)
HCT VFR BLD AUTO: 35.5 % (ref 36–46)
HGB BLD-MCNC: 11.3 G/DL (ref 12–16)
IMM GRANULOCYTES # BLD AUTO: 0.01 X10*3/UL (ref 0–0.7)
IMM GRANULOCYTES NFR BLD AUTO: 0.2 % (ref 0–0.9)
LDH SERPL L TO P-CCNC: 148 U/L (ref 84–246)
LYMPHOCYTES # BLD AUTO: 0.78 X10*3/UL (ref 1.2–4.8)
LYMPHOCYTES NFR BLD AUTO: 18.9 %
MCH RBC QN AUTO: 29 PG (ref 26–34)
MCHC RBC AUTO-ENTMCNC: 31.8 G/DL (ref 32–36)
MCV RBC AUTO: 91 FL (ref 80–100)
MONOCYTES # BLD AUTO: 0.32 X10*3/UL (ref 0.1–1)
MONOCYTES NFR BLD AUTO: 7.7 %
NEUTROPHILS # BLD AUTO: 2.77 X10*3/UL (ref 1.2–7.7)
NEUTROPHILS NFR BLD AUTO: 67.1 %
NRBC BLD-RTO: 0 /100 WBCS (ref 0–0)
PLATELET # BLD AUTO: 254 X10*3/UL (ref 150–450)
POTASSIUM SERPL-SCNC: 3.6 MMOL/L (ref 3.5–5.3)
PROT SERPL-MCNC: 7.2 G/DL (ref 6.4–8.2)
RBC # BLD AUTO: 3.89 X10*6/UL (ref 4–5.2)
SODIUM SERPL-SCNC: 138 MMOL/L (ref 136–145)
WBC # BLD AUTO: 4.1 X10*3/UL (ref 4.4–11.3)

## 2024-07-16 PROCEDURE — 36415 COLL VENOUS BLD VENIPUNCTURE: CPT

## 2024-07-16 PROCEDURE — 1036F TOBACCO NON-USER: CPT | Performed by: INTERNAL MEDICINE

## 2024-07-16 PROCEDURE — 99215 OFFICE O/P EST HI 40 MIN: CPT | Performed by: INTERNAL MEDICINE

## 2024-07-16 PROCEDURE — 77067 SCR MAMMO BI INCL CAD: CPT

## 2024-07-16 PROCEDURE — 3080F DIAST BP >= 90 MM HG: CPT | Performed by: INTERNAL MEDICINE

## 2024-07-16 PROCEDURE — 3075F SYST BP GE 130 - 139MM HG: CPT | Performed by: INTERNAL MEDICINE

## 2024-07-16 PROCEDURE — 83615 LACTATE (LD) (LDH) ENZYME: CPT

## 2024-07-16 PROCEDURE — 80053 COMPREHEN METABOLIC PANEL: CPT

## 2024-07-16 PROCEDURE — 85025 COMPLETE CBC W/AUTO DIFF WBC: CPT

## 2024-07-16 PROCEDURE — 77063 BREAST TOMOSYNTHESIS BI: CPT | Performed by: RADIOLOGY

## 2024-07-16 PROCEDURE — 77067 SCR MAMMO BI INCL CAD: CPT | Performed by: RADIOLOGY

## 2024-07-16 ASSESSMENT — PAIN SCALES - GENERAL: PAINLEVEL: 0-NO PAIN

## 2024-07-16 NOTE — PROGRESS NOTES
Patient ID: Alejandra Rascon is a 54 y.o. female.    Subjective    7/16/24; Patient with nodular lymphocyte predominant Hodgkin's Lymphoma is here for routine follow up.   S/p 6 cycles R-CHOP and post Rx CT/PET showed CR.   Doing very well. Retired now. No new c/o. Neuropathy minimum.    PMHx: chronic LBP with herniated disc at L5-S1 causing sciatica down her right leg.  PSHX; c-sction     FHx; Mother with breast cancer, father with colon cancer, her maternal aunt with uterine ca and breat ca.     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   HENT:      Head: Normocephalic and atraumatic.      Mouth/Throat:      Mouth: Mucous membranes are moist.      Pharynx: Oropharynx is clear.   Eyes:      Extraocular Movements: Extraocular movements intact.      Pupils: Pupils are equal, round, and reactive to light.   Cardiovascular:      Rate and Rhythm: Normal rate and regular rhythm.      Heart sounds: No murmur heard.  Pulmonary:      Effort: Pulmonary effort is normal.      Breath sounds: Normal breath sounds.   Abdominal:      General: Abdomen is flat. Bowel sounds are normal.      Palpations: Abdomen is soft.   Musculoskeletal:         General: Normal range of motion.      Cervical back: Normal range of motion and neck supple.      Right lower leg: No edema.      Left lower leg: No edema.   Skin:     General: Skin is warm and dry.   Neurological:      General: No focal deficit present.      Mental Status: She is alert.   Psychiatric:         Mood and Affect: Mood normal.         Behavior: Behavior normal.          Judgment: Judgment normal.         Performance Status:  Asymptomatic      Assessment/Plan   Assessment:   Patient with newly diagnosed nodular lymphocyte predominant Hodgkin's Lymphoma is here for routine follow up.    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  - continue to use mouth wash as recommended for mucositis  - will monitor CBC closely  - PET scan after C4 showed CR  - stop vincristine due to neuropathy  - PET scan c/w BETTE  - RTC 3 months.    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  - skip Vincristine for cycle 5 and C6      LBP  - cont current meds    HTN    Fhx heart disease     RTC 3 months.           Cancer Staging   No matching staging information was found for the patient.      Oncology History Overview Note   NLPHL(Nodular Lymphocyte predominant B cell Lymphoma)  Patient presented with chronic back pain and MRI 7/11/23 showed DJD and worsening retroperitoneal LN's. CT/PET on 7/17/23 confirmed FDG avid right inguinal, ileac and retroperitoneal LN's. 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    Treatment  R-CHOP x 6 cycles: 9/20/23- 1/9/2024(Vincristine was omitted 2/2 neuropathy C5 and C6)       Nodular lymphocyte predominant Hodgkin lymphoma (Multi)   9/20/2023 -  Chemotherapy    R-CHOP (Cyclophosphamide / DOXOrubicin / VinCRIStine / PredniSONE) + RiTUXimab, 21 Day Cycles     9/27/2023 Initial Diagnosis    Nodular lymphocyte predominant Hodgkin lymphoma (CMS/HCC)            Ok-Carmita Boyd MD

## 2024-07-24 ENCOUNTER — APPOINTMENT (OUTPATIENT)
Dept: PRIMARY CARE | Facility: CLINIC | Age: 54
End: 2024-07-24
Payer: COMMERCIAL

## 2024-07-26 ENCOUNTER — TELEPHONE (OUTPATIENT)
Dept: PRIMARY CARE | Facility: CLINIC | Age: 54
End: 2024-07-26
Payer: COMMERCIAL

## 2024-07-26 NOTE — TELEPHONE ENCOUNTER
Spoke with patient. Reports left foot tingling, discomfort overnight last evening. Was unable to sleep. Has been constant since then, although less intense while up and walking.   Tried tylenol, gabapentin, celebrex, flexeril with no relief of symptoms.  Hx herniated disc in back. Concurrent right lower back pain.   Denies any injuries or overuse of back prior to onset.  Advised to be seen in Urgent care to evaluate lower back to rule out more concerning etiology.

## 2024-08-22 ENCOUNTER — HOSPITAL ENCOUNTER (OUTPATIENT)
Dept: GASTROENTEROLOGY | Facility: HOSPITAL | Age: 54
Setting detail: OUTPATIENT SURGERY
Discharge: HOME | End: 2024-08-22
Payer: COMMERCIAL

## 2024-08-22 ENCOUNTER — ANESTHESIA EVENT (OUTPATIENT)
Dept: GASTROENTEROLOGY | Facility: HOSPITAL | Age: 54
End: 2024-08-22
Payer: COMMERCIAL

## 2024-08-22 ENCOUNTER — ANESTHESIA (OUTPATIENT)
Dept: GASTROENTEROLOGY | Facility: HOSPITAL | Age: 54
End: 2024-08-22
Payer: COMMERCIAL

## 2024-08-22 VITALS
OXYGEN SATURATION: 100 % | DIASTOLIC BLOOD PRESSURE: 81 MMHG | BODY MASS INDEX: 29.57 KG/M2 | RESPIRATION RATE: 18 BRPM | SYSTOLIC BLOOD PRESSURE: 123 MMHG | WEIGHT: 184 LBS | HEART RATE: 71 BPM | HEIGHT: 66 IN | TEMPERATURE: 97 F

## 2024-08-22 DIAGNOSIS — Z12.11 ENCOUNTER FOR SCREENING FOR MALIGNANT NEOPLASM OF COLON: Primary | ICD-10-CM

## 2024-08-22 LAB — HCG UR QL IA.RAPID: NEGATIVE

## 2024-08-22 PROCEDURE — 7100000010 HC PHASE TWO TIME - EACH INCREMENTAL 1 MINUTE

## 2024-08-22 PROCEDURE — 3700000001 HC GENERAL ANESTHESIA TIME - INITIAL BASE CHARGE

## 2024-08-22 PROCEDURE — 81025 URINE PREGNANCY TEST: CPT | Performed by: INTERNAL MEDICINE

## 2024-08-22 PROCEDURE — 45378 DIAGNOSTIC COLONOSCOPY: CPT | Performed by: INTERNAL MEDICINE

## 2024-08-22 PROCEDURE — 2500000005 HC RX 250 GENERAL PHARMACY W/O HCPCS

## 2024-08-22 PROCEDURE — 7100000009 HC PHASE TWO TIME - INITIAL BASE CHARGE

## 2024-08-22 PROCEDURE — 3700000002 HC GENERAL ANESTHESIA TIME - EACH INCREMENTAL 1 MINUTE

## 2024-08-22 PROCEDURE — 2500000004 HC RX 250 GENERAL PHARMACY W/ HCPCS (ALT 636 FOR OP/ED)

## 2024-08-22 PROCEDURE — 2500000004 HC RX 250 GENERAL PHARMACY W/ HCPCS (ALT 636 FOR OP/ED): Performed by: INTERNAL MEDICINE

## 2024-08-22 RX ORDER — SODIUM CHLORIDE, SODIUM LACTATE, POTASSIUM CHLORIDE, CALCIUM CHLORIDE 600; 310; 30; 20 MG/100ML; MG/100ML; MG/100ML; MG/100ML
20 INJECTION, SOLUTION INTRAVENOUS CONTINUOUS
Status: DISCONTINUED | OUTPATIENT
Start: 2024-08-22 | End: 2024-08-23 | Stop reason: HOSPADM

## 2024-08-22 RX ORDER — LIDOCAINE HYDROCHLORIDE 20 MG/ML
INJECTION, SOLUTION INFILTRATION; PERINEURAL AS NEEDED
Status: DISCONTINUED | OUTPATIENT
Start: 2024-08-22 | End: 2024-08-22

## 2024-08-22 RX ORDER — PROPOFOL 10 MG/ML
INJECTION, EMULSION INTRAVENOUS CONTINUOUS PRN
Status: DISCONTINUED | OUTPATIENT
Start: 2024-08-22 | End: 2024-08-22

## 2024-08-22 SDOH — HEALTH STABILITY: MENTAL HEALTH: CURRENT SMOKER: 0

## 2024-08-22 ASSESSMENT — PAIN - FUNCTIONAL ASSESSMENT
PAIN_FUNCTIONAL_ASSESSMENT: 0-10

## 2024-08-22 ASSESSMENT — PAIN SCALES - GENERAL
PAINLEVEL_OUTOF10: 0 - NO PAIN
PAIN_LEVEL: 1
PAINLEVEL_OUTOF10: 0 - NO PAIN
PAINLEVEL_OUTOF10: 0 - NO PAIN

## 2024-08-22 NOTE — H&P
History Of Present Illness  Alejandra Rascon is a 54 y.o. female presenting with screening colonoscopy.     Past Medical History  Past Medical History:   Diagnosis Date    Anesthesia of skin 03/10/2017    Numbness of lip    Body mass index (BMI)40.0-44.9, adult 2021    BMI 40.0-44.9, adult    Elevated blood-pressure reading, without diagnosis of hypertension 03/10/2017    Elevated BP without diagnosis of hypertension    Encounter for gynecological examination (general) (routine) without abnormal findings 2017    Well woman exam    Encounter for immunization 2018    Need for Tdap vaccination    Encounter for other screening for malignant neoplasm of breast 06/15/2018    Screening for breast cancer    Encounter for screening for infections with a predominantly sexual mode of transmission 2016    Screening for STD (sexually transmitted disease)    Encounter for screening for malignant neoplasm of cervix 2017    Screening for cervical cancer    Health maintenance examination 2023    Hx antineoplastic chemo 2023-2024    Localized enlarged lymph nodes 2015    Enlarged lymph nodes in armpit    Localized enlarged lymph nodes 2014    Cervical adenopathy    Morbid (severe) obesity due to excess calories (Multi) 2022    Class 2 severe obesity with serious comorbidity and body mass index (BMI) of 39.0 to 39.9 in adult    Other specified health status 2014    Contraception    Personal history of other diseases of the musculoskeletal system and connective tissue 2014    History of low back pain    Personal history of other specified conditions 2019    History of palpitations    Personal history of other specified conditions 2019    History of paresthesia    Personal history of other specified conditions 2014    History of headache     Surgical History  Past Surgical History:   Procedure Laterality Date     SECTION, CLASSIC   2014     Section     Social History  She reports that she has never smoked. She has never used smokeless tobacco. She reports current alcohol use. She reports that she does not use drugs.    Family History  Family History   Problem Relation Name Age of Onset    Breast cancer Mother Renay 49    Colon cancer Father Agus     Breast cancer Maternal Grandmother Leeanne 35    Breast cancer Mother's Sister      Ovarian cancer Mother's Sister      Breast cancer Father's Sister          Allergies  Allergies   Allergen Reactions    Hydrocodone-Acetaminophen Hives    Hydrocodone-Guaifenesin Hives    Hydrocodone Rash     states OK to take tylenol     Review of Systems   Review of Systems   Constitutional: Negative.  Negative for activity change, appetite change, chills, fatigue, fever and unexpected weight change.   HENT: Negative.     Respiratory: Negative.     Cardiovascular: Negative.  Negative for chest pain and palpitations.   Gastrointestinal: Negative.  Negative for abdominal distention, abdominal pain, anal bleeding, blood in stool, constipation, diarrhea, nausea, rectal pain and vomiting.   Skin: Negative.  Negative for color change and rash.   Neurological: Negative.  Negative for dizziness, tremors, seizures, weakness and headaches.   Psychiatric/Behavioral: Negative.  Negative for confusion.     Physical Exam   General appearance: No acute distress, cooperative.  Eyes: Nonicteric, no redness or proptosis  Ears, nose, mouth, and throat: Moist mucous membranes, tongue normal  Neck: Supple, no lymphadenopathy or thyromegaly  Lungs: Clear to auscultation bilaterally  CV: Regular rate and rhythm, no murmur; no pitting edema in the lower extremities  Abd: soft, non-tender; non-distended; normal active bowel sounds; no scars  Skin: No rashes or lesions; no liver stigmata  MSK: No deformities or joint edema/redness/tenderness  Neuro: Alert and oriented ×3, normal gait, non-focal no asterixis    Last  Recorded Vitals  There were no vitals taken for this visit.    Assessment/Plan        Mathew Camargo MD

## 2024-08-22 NOTE — ANESTHESIA PREPROCEDURE EVALUATION
Patient: Alejandra Rascon    Procedure Information       Date/Time: 08/22/24 1110    Scheduled providers: Mathew Camargo MD    Procedure: COLONOSCOPY    Location: [facility] [place]            Relevant Problems   Cardiac   (+) HTN (hypertension), benign      Neuro   (+) Anxiety   (+) Carpal tunnel syndrome, bilateral   (+) Cervical radiculopathy   (+) Depression with anxiety   (+) Low back pain with sciatica   (+) Lumbar radiculitis      GI   (+) Acid reflux      Hematology   (+) Nodular lymphocyte predominant Hodgkin lymphoma (Multi)      Musculoskeletal   (+) Carpal tunnel syndrome, bilateral   (+) Degenerative disc disease at L5-S1 level   (+) Displacement of lumbar intervertebral disc without myelopathy   (+) Lumbar spondylosis       Clinical information reviewed:   Tobacco  Allergies  Meds  Problems  Med Hx  Surg Hx  OB Status    Fam Hx  Soc Hx        NPO Detail:  NPO/Void Status  Carbohydrate Drink Given Prior to Surgery? : N  Date of Last Liquid: 08/21/24  Time of Last Liquid: 2300  Date of Last Solid: 08/20/24  Time of Last Solid: 2000  Last Intake Type: Clear fluids  Time of Last Void: 1035         Physical Exam    Airway  Mallampati: I  TM distance: >3 FB  Neck ROM: full     Cardiovascular - normal exam  Rhythm: regular  Rate: normal     Dental    Pulmonary - normal exam  Breath sounds clear to auscultation     Abdominal   (+) obese  Abdomen: soft  Bowel sounds: normal           Anesthesia Plan    History of general anesthesia?: yes  History of complications of general anesthesia?: no    ASA 3     general and MAC     The patient is not a current smoker.  Patient was previously instructed to abstain from smoking on day of procedure.  Patient did not smoke on day of procedure.  Education provided regarding risk of obstructive sleep apnea.  intravenous induction   Anesthetic plan and risks discussed with patient and spouse.  Use of blood products discussed with patient and spouse who  consented to blood products.    Plan discussed with [facility]AA.

## 2024-08-22 NOTE — ANESTHESIA POSTPROCEDURE EVALUATION
Patient: Alejandra Rascon    Procedure Summary       Date: 08/22/24 Room / Location: [facility]    Anesthesia Start: 1117 Anesthesia Stop: 1150    Procedure: COLONOSCOPY Diagnosis:       Encounter for screening for malignant neoplasm of colon      Encounter for screening for malignant neoplasm of colon    Scheduled Providers: Mathew Camargo MD Responsible Provider: Sarath Sharma MD    Anesthesia Type: general, MAC ASA Status: 3            Anesthesia Type: general, MAC    Vitals Value Taken Time   /67 08/22/24 1142   Temp 36.2 °C (97.2 °F) 08/22/24 1142   Pulse 96 08/22/24 1142   Resp 14 08/22/24 1142   SpO2 100 % 08/22/24 1142       Anesthesia Post Evaluation    Patient location during evaluation: PACU  Patient participation: complete - patient participated  Level of consciousness: awake  Pain score: 1  Pain management: adequate  Airway patency: patent  Two or more strategies used to mitigate risk of obstructive sleep apnea  Cardiovascular status: acceptable  Respiratory status: acceptable, airway suctioned and face mask  Hydration status: acceptable  Postoperative Nausea and Vomiting: none    No notable events documented.

## 2024-08-22 NOTE — DISCHARGE INSTRUCTIONS
Patient Instructions after a Colonoscopy      The anesthetics, sedatives or narcotics which were given to you today will be acting in your body for the next 24 hours, so you might feel a little sleepy or groggy.  This feeling should slowly wear off. Carefully read and follow the instructions.     You received sedation today:  - Do not drive or operate any machinery or power tools of any kind.   - No alcoholic beverages today, not even beer or wine.  - Do not make any important decisions or sign any legal documents.  - No over the counter medications that contain alcohol or that may cause drowsiness.  - Do not make any important decisions or sign any legal documents.  - Make sure you have someone with you for first 24 hours.    While it is common to experience mild to moderate abdominal distention, gas, or belching after your procedure, if any of these symptoms occur following discharge from the GI Lab or within one week of having your procedure, call the [facility] to be advised whether a visit to your nearest Urgent Care or Emergency Department is indicated.  Take this paper with you if you go.     - If you develop an allergic reaction to the medications that were given during your procedure such as difficulty breathing, rash, hives, severe nausea, vomiting or lightheadedness.  - If you experience chest pain, shortness of breath, severe abdominal pain, fevers and chills.  -If you develop signs and symptoms of bleeding such as blood in your spit, if your stools turn black, tarry, or bloody  - If you have not urinated within 8 hours following your procedure.  - If your IV site becomes painful, red, inflamed, or looks infected.    If you received a biopsy/polypectomy/sphincterotomy the following instructions apply below:    __ Do not use Aspirin containing products, non-steroidal medications or anti-coagulants for one week following your procedure. (Examples of these types of medications are: Advil,  Arthrotec, Aleve, Coumadin, Ecotrin, Heparin, Ibuprofen, Indocin, Motrin, Naprosyn, Nuprin, Plavix, Vioxx, and Voltarin, or their generic forms.  This list is not all-inclusive.  Check with your physician or pharmacist before resuming medications.)   __ Eat a soft diet today.  Avoid foods that are poorly digested for the next 24 hours.  These foods would include: nuts, beans, lettuce, red meats, and fried foods. Start with liquids and advance your diet as tolerated, gradually work up to eating solids.   __ Do not have a Barium Study or Enema for one week.    Your physician recommends the additional following instructions:    -You have a contact number available for emergencies. The signs and symptoms of potential delayed complications were discussed with you. You may return to normal activities tomorrow.  -Resume your previous diet.  -Continue your present medications.   -We are waiting for your pathology results.  -Your physician has recommended a repeat colonoscopy (date to be determined after pending pathology results are reviewed) for surveillance based on pathology results.  -The findings and recommendations have been discussed with you.  -The findings and recommendations were discussed with your family.  - Please see Medication Reconciliation Form for new medication/medications prescribed.       If you experience any problems or have any questions following discharge from the GI Lab, please call:        Nurse Signature                                                                        Date___________________                                                                            Patient/Responsible Party Signature                                        Date___________________

## 2024-09-25 DIAGNOSIS — I10 HTN (HYPERTENSION), BENIGN: ICD-10-CM

## 2024-09-25 RX ORDER — AMLODIPINE BESYLATE 5 MG/1
5 TABLET ORAL DAILY
Qty: 90 TABLET | Refills: 3 | Status: SHIPPED | OUTPATIENT
Start: 2024-09-25

## 2024-10-16 ENCOUNTER — APPOINTMENT (OUTPATIENT)
Dept: PRIMARY CARE | Facility: CLINIC | Age: 54
End: 2024-10-16
Payer: COMMERCIAL

## 2024-10-21 PROBLEM — Z20.822 EXPOSURE TO SEVERE ACUTE RESPIRATORY SYNDROME CORONAVIRUS 2 (SARS-COV-2): Status: ACTIVE | Noted: 2024-10-21

## 2024-10-21 PROBLEM — E65 ABDOMINAL OBESITY: Status: ACTIVE | Noted: 2024-10-21

## 2024-10-21 PROBLEM — G56.00 CARPAL TUNNEL SYNDROME: Status: ACTIVE | Noted: 2024-10-21

## 2024-10-21 PROBLEM — E66.01 MORBID OBESITY (MULTI): Status: ACTIVE | Noted: 2024-10-21

## 2024-10-21 RX ORDER — TIRZEPATIDE 15 MG/.5ML
INJECTION, SOLUTION SUBCUTANEOUS
COMMUNITY
Start: 2024-09-09

## 2024-10-21 RX ORDER — TRAMADOL HYDROCHLORIDE 50 MG/1
TABLET ORAL
COMMUNITY
Start: 2024-02-24 | End: 2024-10-23 | Stop reason: WASHOUT

## 2024-10-21 RX ORDER — SEMAGLUTIDE 0.25 MG/.5ML
INJECTION, SOLUTION SUBCUTANEOUS
COMMUNITY
Start: 2024-05-07 | End: 2024-10-23

## 2024-10-22 ENCOUNTER — LAB (OUTPATIENT)
Dept: LAB | Facility: HOSPITAL | Age: 54
End: 2024-10-22
Payer: COMMERCIAL

## 2024-10-22 ENCOUNTER — OFFICE VISIT (OUTPATIENT)
Dept: HEMATOLOGY/ONCOLOGY | Facility: HOSPITAL | Age: 54
End: 2024-10-22
Payer: COMMERCIAL

## 2024-10-22 VITALS
HEART RATE: 91 BPM | BODY MASS INDEX: 29.28 KG/M2 | TEMPERATURE: 96.8 F | DIASTOLIC BLOOD PRESSURE: 89 MMHG | SYSTOLIC BLOOD PRESSURE: 133 MMHG | RESPIRATION RATE: 16 BRPM | OXYGEN SATURATION: 100 % | WEIGHT: 181.44 LBS

## 2024-10-22 DIAGNOSIS — C81.00 NODULAR LYMPHOCYTE PREDOMINANT HODGKIN LYMPHOMA, UNSPECIFIED BODY REGION (MULTI): ICD-10-CM

## 2024-10-22 DIAGNOSIS — C81.00 NODULAR LYMPHOCYTE PREDOMINANT HODGKIN LYMPHOMA, UNSPECIFIED BODY REGION (MULTI): Primary | ICD-10-CM

## 2024-10-22 DIAGNOSIS — Z00.00 HEALTH MAINTENANCE EXAMINATION: ICD-10-CM

## 2024-10-22 LAB
ALBUMIN SERPL BCP-MCNC: 4.5 G/DL (ref 3.4–5)
ALP SERPL-CCNC: 75 U/L (ref 33–110)
ALT SERPL W P-5'-P-CCNC: 13 U/L (ref 7–45)
ANION GAP SERPL CALC-SCNC: 13 MMOL/L (ref 10–20)
AST SERPL W P-5'-P-CCNC: 16 U/L (ref 9–39)
BASOPHILS # BLD AUTO: 0.02 X10*3/UL (ref 0–0.1)
BASOPHILS NFR BLD AUTO: 0.7 %
BILIRUB SERPL-MCNC: 0.8 MG/DL (ref 0–1.2)
BUN SERPL-MCNC: 17 MG/DL (ref 6–23)
CALCIUM SERPL-MCNC: 10.2 MG/DL (ref 8.6–10.3)
CHLORIDE SERPL-SCNC: 104 MMOL/L (ref 98–107)
CO2 SERPL-SCNC: 29 MMOL/L (ref 21–32)
CREAT SERPL-MCNC: 0.62 MG/DL (ref 0.5–1.05)
EGFRCR SERPLBLD CKD-EPI 2021: >90 ML/MIN/1.73M*2
EOSINOPHIL # BLD AUTO: 0.05 X10*3/UL (ref 0–0.7)
EOSINOPHIL NFR BLD AUTO: 1.6 %
ERYTHROCYTE [DISTWIDTH] IN BLOOD BY AUTOMATED COUNT: 15.4 % (ref 11.5–14.5)
GLUCOSE SERPL-MCNC: 75 MG/DL (ref 74–99)
HCT VFR BLD AUTO: 34.6 % (ref 36–46)
HGB BLD-MCNC: 11.3 G/DL (ref 12–16)
IGA SERPL-MCNC: 172 MG/DL (ref 70–400)
IGG SERPL-MCNC: 732 MG/DL (ref 700–1600)
IGM SERPL-MCNC: 34 MG/DL (ref 40–230)
IMM GRANULOCYTES # BLD AUTO: 0.01 X10*3/UL (ref 0–0.7)
IMM GRANULOCYTES NFR BLD AUTO: 0.3 % (ref 0–0.9)
LDH SERPL L TO P-CCNC: 163 U/L (ref 84–246)
LYMPHOCYTES # BLD AUTO: 0.55 X10*3/UL (ref 1.2–4.8)
LYMPHOCYTES NFR BLD AUTO: 18.1 %
MCH RBC QN AUTO: 29.5 PG (ref 26–34)
MCHC RBC AUTO-ENTMCNC: 32.7 G/DL (ref 32–36)
MCV RBC AUTO: 90 FL (ref 80–100)
MONOCYTES # BLD AUTO: 0.24 X10*3/UL (ref 0.1–1)
MONOCYTES NFR BLD AUTO: 7.9 %
NEUTROPHILS # BLD AUTO: 2.17 X10*3/UL (ref 1.2–7.7)
NEUTROPHILS NFR BLD AUTO: 71.4 %
NRBC BLD-RTO: 0 /100 WBCS (ref 0–0)
PLATELET # BLD AUTO: 271 X10*3/UL (ref 150–450)
POTASSIUM SERPL-SCNC: 3.8 MMOL/L (ref 3.5–5.3)
PROT SERPL-MCNC: 6.8 G/DL (ref 6.4–8.2)
PROT SERPL-MCNC: 7.2 G/DL (ref 6.4–8.2)
RBC # BLD AUTO: 3.83 X10*6/UL (ref 4–5.2)
SODIUM SERPL-SCNC: 142 MMOL/L (ref 136–145)
WBC # BLD AUTO: 3 X10*3/UL (ref 4.4–11.3)

## 2024-10-22 PROCEDURE — 83036 HEMOGLOBIN GLYCOSYLATED A1C: CPT

## 2024-10-22 PROCEDURE — 83615 LACTATE (LD) (LDH) ENZYME: CPT

## 2024-10-22 PROCEDURE — 3075F SYST BP GE 130 - 139MM HG: CPT | Performed by: INTERNAL MEDICINE

## 2024-10-22 PROCEDURE — 84155 ASSAY OF PROTEIN SERUM: CPT

## 2024-10-22 PROCEDURE — 99215 OFFICE O/P EST HI 40 MIN: CPT | Performed by: INTERNAL MEDICINE

## 2024-10-22 PROCEDURE — 82784 ASSAY IGA/IGD/IGG/IGM EACH: CPT

## 2024-10-22 PROCEDURE — 80053 COMPREHEN METABOLIC PANEL: CPT

## 2024-10-22 PROCEDURE — 3079F DIAST BP 80-89 MM HG: CPT | Performed by: INTERNAL MEDICINE

## 2024-10-22 PROCEDURE — 36415 COLL VENOUS BLD VENIPUNCTURE: CPT

## 2024-10-22 PROCEDURE — 85025 COMPLETE CBC W/AUTO DIFF WBC: CPT

## 2024-10-22 ASSESSMENT — PAIN SCALES - GENERAL: PAINLEVEL_OUTOF10: 0-NO PAIN

## 2024-10-22 NOTE — PROGRESS NOTES
Patient ID: Alejandra Rascon is a 54 y.o. female.    Subjective    7/16/24; Patient with nodular lymphocyte predominant Hodgkin's Lymphoma is here for routine follow up.   S/p 6 cycles R-CHOP and post Rx CT/PET showed CR.   Doing very well. Retired now. No new c/o. Neuropathy minimum.  10/22/24: doing well. No new c/o    PMHx: chronic LBP with herniated disc at L5-S1 causing sciatica down her right leg.  PSHX; c-sction     FHx; Mother with breast cancer, father with colon cancer, her maternal aunt with uterine ca and breat ca.     Social History:   Social Substance History:    ·  Smoking Status never smoker (1)  ·  Additional History    lives with , works at post [employer] as a (1)-> retired recently    Objective    BSA: 1.96 meters squared  /89   Pulse 91   Temp 36 °C (96.8 °F)   Resp 16   Wt 82.3 kg (181 lb 7 oz)   SpO2 100%   BMI 29.28 kg/m²      Physical Exam  Constitutional:       Appearance: Normal appearance.   HENT:      Head: Normocephalic and atraumatic.      Mouth/Throat:      Mouth: Mucous membranes are moist.      Pharynx: Oropharynx is clear.   Eyes:      Extraocular Movements: Extraocular movements intact.      Pupils: Pupils are equal, round, and reactive to light.   Cardiovascular:      Rate and Rhythm: Normal rate and regular rhythm.      Heart sounds: No murmur heard.  Pulmonary:      Effort: Pulmonary effort is normal.      Breath sounds: Normal breath sounds.   Abdominal:      General: Abdomen is flat. Bowel sounds are normal.      Palpations: Abdomen is soft.   Musculoskeletal:         General: Normal range of motion.      Cervical back: Normal range of motion and neck supple.      Right lower leg: No edema.      Left lower leg: No edema.   Skin:     General: Skin is warm and dry.   Neurological:      General: No focal deficit present.      Mental Status: She is alert.   Psychiatric:         Mood and Affect: Mood normal.         Behavior: Behavior normal.          Judgment: Judgment normal.     === 02/23/24 ===    NM PET CT LYMPHOMA STAGING    - Impression -  1. Normal-appearing external iliac and inguinal lymph nodes with mild  hypermetabolic activity which is below blood pool. Trista 2.  2. Postsurgical changes of right inguinal lymph node excisional  biopsy.  3. No evidence of extranodal hypermetabolic disease    Performance Status:  Asymptomatic      Assessment/Plan   Assessment:   Patient with newly diagnosed nodular lymphocyte predominant Hodgkin's Lymphoma is here for routine follow up.    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  - continue to use mouth wash as recommended for mucositis  - will monitor CBC closely  - PET scan after C4 showed CR  - stop vincristine due to neuropathy  - PET scan 2/23/24 c/w BETTE  - RTC 3 months.    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  - skip Vincristine for cycle 5 and C6      LBP  - cont current meds    HTN    Fhx heart disease     RTC 3 months.           Cancer Staging   No matching staging information was found for the patient.      Oncology History Overview Note   NLPHL(Nodular Lymphocyte predominant B cell Lymphoma)  Patient presented with chronic back pain and MRI 7/11/23 showed DJD and worsening retroperitoneal LN's. CT/PET on 7/17/23 confirmed FDG avid right inguinal, ileac and retroperitoneal LN's. 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    Treatment  R-CHOP x 6 cycles: 9/20/23- 1/9/2024(Vincristine was omitted 2/2 neuropathy C5 and C6)       Nodular lymphocyte predominant Hodgkin lymphoma   9/20/2023 - 1/23/2024 Chemotherapy    R-CHOP (Cyclophosphamide  / DOXOrubicin / VinCRIStine / PredniSONE) + RiTUXimab, 21 Day Cycles     9/27/2023 Initial Diagnosis    Nodular lymphocyte predominant Hodgkin lymphoma (CMS/HCC)            Erlinda Boyd MD

## 2024-10-23 ENCOUNTER — APPOINTMENT (OUTPATIENT)
Dept: PRIMARY CARE | Facility: CLINIC | Age: 54
End: 2024-10-23
Payer: COMMERCIAL

## 2024-10-23 VITALS
HEIGHT: 66 IN | SYSTOLIC BLOOD PRESSURE: 112 MMHG | DIASTOLIC BLOOD PRESSURE: 70 MMHG | OXYGEN SATURATION: 100 % | BODY MASS INDEX: 29.18 KG/M2 | WEIGHT: 181.6 LBS | TEMPERATURE: 97.5 F | HEART RATE: 81 BPM

## 2024-10-23 DIAGNOSIS — I10 PRIMARY HYPERTENSION: ICD-10-CM

## 2024-10-23 DIAGNOSIS — R68.89 COLD INTOLERANCE: ICD-10-CM

## 2024-10-23 DIAGNOSIS — Z23 NEED FOR INFLUENZA VACCINATION: ICD-10-CM

## 2024-10-23 DIAGNOSIS — Z00.00 HEALTH MAINTENANCE EXAMINATION: Primary | ICD-10-CM

## 2024-10-23 PROBLEM — R59.0 RETROPERITONEAL LYMPHADENOPATHY: Status: RESOLVED | Noted: 2023-10-07 | Resolved: 2024-10-23

## 2024-10-23 PROBLEM — R59.0 CERVICAL ADENOPATHY: Status: RESOLVED | Noted: 2023-10-07 | Resolved: 2024-10-23

## 2024-10-23 PROBLEM — Z20.822 EXPOSURE TO SEVERE ACUTE RESPIRATORY SYNDROME CORONAVIRUS 2 (SARS-COV-2): Status: RESOLVED | Noted: 2024-10-21 | Resolved: 2024-10-23

## 2024-10-23 PROBLEM — R63.2 POLYPHAGIA: Status: RESOLVED | Noted: 2023-10-07 | Resolved: 2024-10-23

## 2024-10-23 PROBLEM — E65 ABDOMINAL OBESITY: Status: RESOLVED | Noted: 2024-10-21 | Resolved: 2024-10-23
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
## 2024-10-23 PROBLEM — R93.89 ULTRASOUND SCAN ABNORMAL: Status: RESOLVED | Noted: 2023-10-07 | Resolved: 2024-10-23

## 2024-10-23 PROBLEM — G44.309 POST-TRAUMATIC HEADACHE: Status: RESOLVED | Noted: 2023-10-07 | Resolved: 2024-10-23

## 2024-10-23 PROBLEM — E66.9 ABDOMINAL OBESITY AND METABOLIC SYNDROME: Status: RESOLVED | Noted: 2023-10-07 | Resolved: 2024-10-23
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
## 2024-10-23 PROBLEM — R55 SYNCOPE: Status: RESOLVED | Noted: 2023-10-07 | Resolved: 2024-10-23

## 2024-10-23 PROBLEM — F07.81 POSTCONCUSSION SYNDROME: Status: RESOLVED | Noted: 2023-10-07 | Resolved: 2024-10-23

## 2024-10-23 PROBLEM — E66.01 MORBID OBESITY (MULTI): Status: RESOLVED | Noted: 2024-10-21 | Resolved: 2024-10-23

## 2024-10-23 PROBLEM — E88.810 ABDOMINAL OBESITY AND METABOLIC SYNDROME: Status: RESOLVED | Noted: 2023-10-07 | Resolved: 2024-10-23

## 2024-10-23 PROBLEM — R59.0 LYMPHADENOPATHY, INGUINAL: Status: RESOLVED | Noted: 2023-10-07 | Resolved: 2024-10-23

## 2024-10-23 PROBLEM — N95.0 POST-MENOPAUSAL BLEEDING: Status: RESOLVED | Noted: 2023-06-14 | Resolved: 2024-10-23

## 2024-10-23 PROBLEM — R32 URINARY INCONTINENCE: Status: RESOLVED | Noted: 2023-10-07 | Resolved: 2024-10-23

## 2024-10-23 PROBLEM — R63.5 WEIGHT GAIN: Status: RESOLVED | Noted: 2023-10-07 | Resolved: 2024-10-23

## 2024-10-23 LAB
EST. AVERAGE GLUCOSE BLD GHB EST-MCNC: 108 MG/DL
HBA1C MFR BLD: 5.4 %

## 2024-10-23 PROCEDURE — 99396 PREV VISIT EST AGE 40-64: CPT | Performed by: FAMILY MEDICINE

## 2024-10-23 PROCEDURE — 90471 IMMUNIZATION ADMIN: CPT | Performed by: FAMILY MEDICINE

## 2024-10-23 PROCEDURE — 90656 IIV3 VACC NO PRSV 0.5 ML IM: CPT | Performed by: FAMILY MEDICINE

## 2024-10-23 PROCEDURE — 3074F SYST BP LT 130 MM HG: CPT | Performed by: FAMILY MEDICINE

## 2024-10-23 PROCEDURE — 1036F TOBACCO NON-USER: CPT | Performed by: FAMILY MEDICINE

## 2024-10-23 PROCEDURE — 3008F BODY MASS INDEX DOCD: CPT | Performed by: FAMILY MEDICINE

## 2024-10-23 PROCEDURE — 3078F DIAST BP <80 MM HG: CPT | Performed by: FAMILY MEDICINE

## 2024-10-23 ASSESSMENT — ENCOUNTER SYMPTOMS
DEPRESSION: 0
LOSS OF SENSATION IN FEET: 0
OCCASIONAL FEELINGS OF UNSTEADINESS: 0

## 2024-10-23 ASSESSMENT — PATIENT HEALTH QUESTIONNAIRE - PHQ9
2. FEELING DOWN, DEPRESSED OR HOPELESS: SEVERAL DAYS
1. LITTLE INTEREST OR PLEASURE IN DOING THINGS: SEVERAL DAYS
SUM OF ALL RESPONSES TO PHQ9 QUESTIONS 1 AND 2: 2
10. IF YOU CHECKED OFF ANY PROBLEMS, HOW DIFFICULT HAVE THESE PROBLEMS MADE IT FOR YOU TO DO YOUR WORK, TAKE CARE OF THINGS AT HOME, OR GET ALONG WITH OTHER PEOPLE: SOMEWHAT DIFFICULT

## 2024-10-23 ASSESSMENT — PAIN SCALES - GENERAL: PAINLEVEL_OUTOF10: 0-NO PAIN

## 2024-10-23 NOTE — ASSESSMENT & PLAN NOTE
Check some additional labs  Discussed that this could be related to history of chemotherapy  Could also be related to significant recent weight loss, continue to monitor

## 2024-10-23 NOTE — PROGRESS NOTES
Reason for Visit: Annual Physical Exam    HPI:  Continues to see oncology every 3 months, finished chemo for lymphoma in Jan 2024 and last couple of PET scans have been clear  Does have some residual neuropathy in the right arm, not clear if just chemo or related to previous carpal tunnel diagnosis  Dealing with back pain, sees her specialist and will be having imaging soon  On Zepbound and has lost almost 60lbs, feels well  Does feel cold all the time, has to drink warm liquids, wear extra layers.  Recent labs show hemoglobin of 11.3. Denies any fatigue or other associated symptoms.    Active Problem List  Patient Active Problem List   Diagnosis    Cervical radiculopathy    Degenerative disc disease at L5-S1 level    History of colonic polyps    Hot flashes due to menopause    Health maintenance examination    Primary hypertension    Anxiety    Nodular lymphocyte predominant Hodgkin lymphoma    Gastroesophageal reflux disease    Carpal tunnel syndrome, bilateral    Mixed anxiety depressive disorder    Disorder of sacroiliac joint    Herniated lumbar intervertebral disc    Vertigo    Low back pain with sciatica    Lumbar radiculitis    Lumbar spondylosis    Sprain of ligament of lumbosacral joint    Carpal tunnel syndrome    Cold intolerance       Comprehensive Medical/Surgical/Social/Family History  Past Medical History:   Diagnosis Date    Abdominal obesity 10/21/2024    Abdominal obesity and metabolic syndrome 10/07/2023    Anesthesia of skin 03/10/2017    Numbness of lip    Body mass index (BMI)40.0-44.9, adult 05/26/2021    BMI 40.0-44.9, adult    Cervical adenopathy 10/07/2023    Elevated blood-pressure reading, without diagnosis of hypertension 03/10/2017    Elevated BP without diagnosis of hypertension    Encounter for gynecological examination (general) (routine) without abnormal findings 08/29/2017    Well woman exam    Encounter for immunization 04/23/2018    Need for Tdap vaccination    Encounter for  other screening for malignant neoplasm of breast 06/15/2018    Screening for breast cancer    Encounter for screening for infections with a predominantly sexual mode of transmission 2016    Screening for STD (sexually transmitted disease)    Encounter for screening for malignant neoplasm of cervix 2017    Screening for cervical cancer    Exposure to severe acute respiratory syndrome coronavirus 2 (SARS-CoV-2) 10/21/2024    Health maintenance examination 2023    Hx antineoplastic chemo 2023-2024    Injury caused by twisting with repetitive movement 2019    Localized enlarged lymph nodes 2015    Enlarged lymph nodes in armpit    Localized enlarged lymph nodes 2014    Cervical adenopathy    Lymphadenopathy, inguinal 10/07/2023    Morbid (severe) obesity due to excess calories (Multi) 2022    Class 2 severe obesity with serious comorbidity and body mass index (BMI) of 39.0 to 39.9 in adult    Morbid obesity (Multi) 10/21/2024    Other specified health status 2014    Contraception    Personal history of other diseases of the musculoskeletal system and connective tissue 2014    History of low back pain    Personal history of other specified conditions 2019    History of palpitations    Personal history of other specified conditions 2019    History of paresthesia    Personal history of other specified conditions 2014    History of headache    Polyphagia 10/07/2023    Post-menopausal bleeding 2023    Post-traumatic headache 10/07/2023    Comment on above: Added by Problem List Migration; 2013; Moved to [facility] 2013 4:45PM;      Postconcussion syndrome 10/07/2023    Retroperitoneal lymphadenopathy 10/07/2023     Past Surgical History:   Procedure Laterality Date     SECTION, CLASSIC  2014     Section     Social History     Tobacco Use    Smoking status: Never    Smokeless tobacco: Never   Vaping Use     Vaping status: Never Used   Substance Use Topics    Alcohol use: Yes     Comment: rarely    Drug use: Never     Family History   Problem Relation Name Age of Onset    Breast cancer Mother Renay 49    Colon cancer Father Agus     Breast cancer Maternal Grandmother Leeanne 35    Breast cancer Mother's Sister      Ovarian cancer Mother's Sister      Breast cancer Father's Sister           Allergies and Medications  Hydrocodone  Current Outpatient Medications on File Prior to Visit   Medication Sig Dispense Refill    amLODIPine (Norvasc) 5 mg tablet Take 1 tablet (5 mg) by mouth once daily. 90 tablet 3    biotin 5 mg tablet Take 1 tablet (5 mg) by mouth once daily.      CeleBREX 100 mg capsule Take by mouth.      cyclobenzaprine (Flexeril) 10 mg tablet Take 1 tablet (10 mg) by mouth if needed.      gabapentin (Neurontin) 300 mg capsule TAKE ONE CAPSULE BY MOUTH THREE TIMES A DAY 90 capsule 0    meclizine (Antivert) 25 mg tablet Take by mouth.      multivitamin tablet Take by mouth.      triamcinolone (Kenalog) 0.025 % ointment Triamcinolone Acetonide 0.025 % External Ointment  Refills: 0  Start: 7-May-2020      Zepbound 15 mg/0.5 mL injection INJECT 15 MG SUBCUTANEOUSLY EVERY WEEK. PLEASE DO NOT FILL AFTER 30 DAYS IF NOT PICKED UP.      [DISCONTINUED] Wegovy 0.25 mg/0.5 mL pen injector INJECT 0.25 MG SUBCUTANEOUSLY EVERY WEEK FOR WEIGHT LOSS      [DISCONTINUED] ALPRAZolam (Xanax) 0.25 mg tablet Take 1 tablet (0.25 mg) by mouth 2 times a day as needed for anxiety for up to 15 days. 30 tablet 0    [DISCONTINUED] ciclopirox (Loprox) 0.77 % suspension APPLY TO AFFECTED AREAS ON SCALP TWICE DAILY AS NEEDED. MAY DECREASE TO 3 TIMES A WEEK WHEN CLEAR. (Patient not taking: Reported on 10/23/2024)      [DISCONTINUED] clobetasol (Temovate) 0.05 % ointment APPLY TOPICALLY TO AFFECTED AREAS ON SCALP  BODY TWICE A DAY AS NEEDED  TWO WEEKS ON / ONE WEEK OFF. STOP WHEN CLEAR. AVOID FACE  AXILLAE  G (Patient not taking: Reported on  "10/23/2024)      [DISCONTINUED] fluocinolone (Synalar) 0.025 % ointment Apply topically 2 times a day. (Patient not taking: Reported on 10/23/2024)      [DISCONTINUED] metFORMIN (Glucophage) 500 mg tablet  (Patient not taking: Reported on 10/23/2024)      [DISCONTINUED] sertraline (Zoloft) 50 mg tablet Take 1 tablet (50 mg) by mouth once daily. 30 tablet 5    [DISCONTINUED] traMADol (Ultram) 50 mg tablet TAKE 1 TABLET BY MOUTH EVERY 8-12 HOURS AS NEEDED FOR PAIN (Patient not taking: Reported on 10/23/2024)      [DISCONTINUED] Zepbound 5 mg/0.5 mL injection Inject 5.0 mg subcutaneously every week. (Patient not taking: Reported on 10/23/2024) 2 mL 0     No current facility-administered medications on file prior to visit.         ROS otherwise negative aside from what was mentioned above in HPI.    Vitals  /70 (BP Location: Right arm, Patient Position: Sitting, BP Cuff Size: Large adult)   Pulse 81   Temp 36.4 °C (97.5 °F) (Temporal)   Ht 1.676 m (5' 6\")   Wt 82.4 kg (181 lb 9.6 oz)   SpO2 100%   BMI 29.31 kg/m²   Body mass index is 29.31 kg/m².  Physical Exam  Gen: Alert, NAD  HEENT:  PERRL, EOMI, conjunctiva and sclera normal in appearance. External auditory canals/TMs normal; Oral cavity and posterior pharynx without lesions/exudate  Neck:  Supple with FROM; No masses/nodes palpable; Thyroid nontender and without nodules; No CE  Respiratory:  Lungs CTAB  Cardiovascular:  Heart RRR. No M/R/G. Peripheral pulses equal bilaterally  Extremities:  FROM all extremities; Muscle strength grossly normal with good tone  Neuro:  CN II-XII intact; Gross motor and sensory intact  Skin:  No suspicious lesions present    Assessment and Plan:  Problem List Items Addressed This Visit       Health maintenance examination - Primary    Current Assessment & Plan     Recommended screening guidelines addressed and orders placed as indicated by age and chronic conditions  Screening labs ordered, will call with " results  Screening tests up to date  Continue follow up with specialists  Continue to work on healthy lifestyle including well balanced diet, regular activity, limit alcohol, no tobacco products   Follow up annually           Relevant Orders    Lipid Panel    Hemoglobin A1C (Completed)    Primary hypertension    Cold intolerance    Current Assessment & Plan     Check some additional labs  Discussed that this could be related to history of chemotherapy  Could also be related to significant recent weight loss, continue to monitor         Relevant Orders    TSH with reflex to Free T4 if abnormal     Other Visit Diagnoses       Need for influenza vaccination        Relevant Orders    Flu vaccine, trivalent, preservative free, age 6 months and greater (Fluraix/Fluzone/Flulaval) (Completed)              Sue Moyer MD

## 2024-10-28 LAB
ALBUMIN: 4.1 G/DL (ref 3.4–5)
ALPHA 1 GLOBULIN: 0.3 G/DL (ref 0.2–0.6)
ALPHA 2 GLOBULIN: 1 G/DL (ref 0.4–1.1)
BETA GLOBULIN: 0.8 G/DL (ref 0.5–1.2)
GAMMA GLOBULIN: 0.7 G/DL (ref 0.5–1.4)
IMMUNOFIXATION COMMENT: NORMAL
PATH REVIEW - SERUM IMMUNOFIXATION: NORMAL
PATH REVIEW-SERUM PROTEIN ELECTROPHORESIS: NORMAL
PROTEIN ELECTROPHORESIS COMMENT: NORMAL

## 2024-10-30 ENCOUNTER — OFFICE VISIT (OUTPATIENT)
Dept: ORTHOPEDIC SURGERY | Facility: CLINIC | Age: 54
End: 2024-10-30
Payer: COMMERCIAL

## 2024-10-30 DIAGNOSIS — G56.01 CARPAL TUNNEL SYNDROME ON RIGHT: ICD-10-CM

## 2024-10-30 DIAGNOSIS — M79.641 PAIN OF RIGHT HAND: ICD-10-CM

## 2024-10-30 PROCEDURE — 99213 OFFICE O/P EST LOW 20 MIN: CPT | Performed by: ORTHOPAEDIC SURGERY

## 2024-10-30 PROCEDURE — 1036F TOBACCO NON-USER: CPT | Performed by: ORTHOPAEDIC SURGERY

## 2024-10-30 NOTE — LETTER
November 14, 2024     Sue Moyer MD  [address]  [address]  [place] [address]    Patient: Alejandra Rascon   YOB: 1970   Date of Visit: 10/30/2024       Dear Dr. Sue Moyer MD:    Thank you for referring Alejandra Rascon to me for evaluation. Below are my notes for this consultation.  If you have questions, please do not hesitate to call me. I look forward to following your patient along with you.       Sincerely,     Kermit Ulrich MD      CC: No Recipients  ______________________________________________________________________________________    CHIEF COMPLAINT         Right hand pain and tingling    ASSESSMENT + PLAN    Right hand paresthesias after remote carpal tunnel release    We had a long discussion about possible sources of the nerve symptoms.  This may represent incomplete decompression, tethering of the nerve by scar, recurrent compression at a new level, or simply enough electrical dysfunction to produce symptoms in the absence of a mechanical compression.  To see if there is a mechanical target to explain the symptoms that could potentially be a surgical target down the road, we agreed on obtaining a Neuromuscular Ultrasound.  The study was ordered.  Continue with night splinting in the meantime.    Follow-up once that study is complete, or with any additional concerns.        HISTORY OF PRESENT ILLNESS       Patient returns today, almost 2 years after last visit, following a right carpal tunnel release in December 2022, with good relief of symptoms at that time.  In the interim she has had lymphoma and has been through chemotherapy.  She is now having recurrent tingling into the right hand, though difficult to localize.  There is a question as to whether this is chemotherapy-induced peripheral neuropathy versus a recurrent carpal tunnel syndrome, versus a nerve compression at a different level.  There has been no interval mechanical trauma.  Mild tingling on the left.  No  noted weakness.  Not dropping objects.  She has not been using her night brace.      PHYSICAL EXAM       She remains well-developed, well-nourished female in no acute distress.  She appears her stated age and has a pleasant affect.  Skin of the right hand and wrist is intact with no erythema, ecchymosis, or diffuse swelling.  Normal skin drag and coloration.  The old carpal tunnel incision is well-healed and nontender.  Negative Tinel and Phalen.  Equivocal Durkan.  Negative reverse Phalen.  5/5 APB and hand intrinsics with no wasting.  Sensation intact to light touch by blinded confrontation in all distributions.  Cervical range of motion is good and does not reproduce chief complaint.  Capillary refill less than 2 seconds throughout.  2+ radial and ulnar pulses.      IMAGING / LABS / EMGs    None today      Electronically Signed      IVETTE Ulrich MD      Orthopaedic Hand Surgery      [phone]

## 2024-10-30 NOTE — PROGRESS NOTES
CHIEF COMPLAINT         Right hand pain and tingling    ASSESSMENT + PLAN    Right hand paresthesias after remote carpal tunnel release    We had a long discussion about possible sources of the nerve symptoms.  This may represent incomplete decompression, tethering of the nerve by scar, recurrent compression at a new level, or simply enough electrical dysfunction to produce symptoms in the absence of a mechanical compression.  To see if there is a mechanical target to explain the symptoms that could potentially be a surgical target down the road, we agreed on obtaining a Neuromuscular Ultrasound.  The study was ordered.  Continue with night splinting in the meantime.    Follow-up once that study is complete, or with any additional concerns.        HISTORY OF PRESENT ILLNESS       Patient returns today, almost 2 years after last visit, following a right carpal tunnel release in December 2022, with good relief of symptoms at that time.  In the interim she has had lymphoma and has been through chemotherapy.  She is now having recurrent tingling into the right hand, though difficult to localize.  There is a question as to whether this is chemotherapy-induced peripheral neuropathy versus a recurrent carpal tunnel syndrome, versus a nerve compression at a different level.  There has been no interval mechanical trauma.  Mild tingling on the left.  No noted weakness.  Not dropping objects.  She has not been using her night brace.      PHYSICAL EXAM       She remains well-developed, well-nourished female in no acute distress.  She appears her stated age and has a pleasant affect.  Skin of the right hand and wrist is intact with no erythema, ecchymosis, or diffuse swelling.  Normal skin drag and coloration.  The old carpal tunnel incision is well-healed and nontender.  Negative Tinel and Phalen.  Equivocal Durkan.  Negative reverse Phalen.  5/5 APB and hand intrinsics with no wasting.  Sensation intact to light touch by  blinded confrontation in all distributions.  Cervical range of motion is good and does not reproduce chief complaint.  Capillary refill less than 2 seconds throughout.  2+ radial and ulnar pulses.      IMAGING / LABS / EMGs    None today      Electronically Signed      IVETTE Ulrich MD      Orthopaedic Hand Surgery      [phone]

## 2024-11-01 ENCOUNTER — TELEPHONE (OUTPATIENT)
Dept: ORTHOPEDIC SURGERY | Facility: HOSPITAL | Age: 54
End: 2024-11-01
Payer: COMMERCIAL

## 2024-11-01 NOTE — TELEPHONE ENCOUNTER
Copied from CRM #[id number]. Topic: Information Request - Doctor, Hospital, or Provider  >> Oct 31, 2024  4:03 PM Laura JUAN wrote:  10/31/24-Spoke with patient; [phone]; patient calling to schedule her point of care neuromuscular ultrasound exam; advised patient I would need to connect her to the department that can assist her with getting this shceduled for her;provided patient with phone number to the department that schedules these; patient states she has been calling and left voicemails for the department and no one is returning her call; advised patient we can try to reach out to ordering providers office to see if they can help assist patient with getting this scheduled for her; if there is anyway someone from office can please reach out to patient in regards to this matter it would be greatly appreciated; patient's return PH is: [phone]

## 2024-11-13 ENCOUNTER — PROCEDURE VISIT (OUTPATIENT)
Dept: NEUROLOGY | Facility: HOSPITAL | Age: 54
End: 2024-11-13
Payer: COMMERCIAL

## 2024-11-13 DIAGNOSIS — G56.01 RIGHT CARPAL TUNNEL SYNDROME: Primary | ICD-10-CM

## 2024-11-13 PROCEDURE — 76883 US NRV&ACC STRUX 1XTR COMPRE: CPT | Performed by: STUDENT IN AN ORGANIZED HEALTH CARE EDUCATION/TRAINING PROGRAM

## 2024-11-13 NOTE — PROGRESS NOTES
Performed by: Henry Palacios MD  Authorized by: Henry Palacios MD      Comments: NEUROMUSCULAR ULTRASOUND OF THE RIGHT UPPER EXTREMITY    INDICATION:   Clinical Information: She has many years of carpal tunnel symptoms and right worse than left hands, including numbness tingling and pain.  She underwent right carpal tunnel release surgery 2 years ago.  She has not done any surgery on the left.  She has had progressively worsening numbness tingling and discomfort in the thumb worse than all other digits since undergoing chemotherapy in the last year.    Neuromuscular ultrasound to be performed:  (a) to evaluate the echotexture and size of the right median nerve in the limb with attention to the carpal tunnel;  (b) to assess for any identifiable structural source of right median nerve compression;   (c) to assess adjacent structures around the right median nerve for abnormalities;  (d) to assess the right wrist joint for abnormalities.    HEIGHT: 5 ft 6 in  WEIGHT: 174 lbs.  HANDEDNESS: Right    COMPARISON:  None.    TECHNIQUE:  The right median nerve and accompanying structures were studied throughout their entire anatomic course in the limb from the wrist to the axilla, including real-time cine imaging. The examination was performed using a [facility] [employer] ultrasound machine with a 15-6 MHz matrix linear transducer. Both axial and longitudinal views were obtained. The patient was examined supine with the arm extended and supinated. Cross sectional area (CSA) was measured within the epineurium, using the trace method. At locations where repeat measurements were taken, the mean value is reported below. Transverse and longitudinal images were obtained.     FINDINGS:  At the right wrist at the distal wrist crease (proximal carpal tunnel), the median nerve CSA was bifid with a combined CSA of 38.5 mm2 (NL < 10 mm2; borderline 10-13 mm2; ABN > 13 mm2).    The echogenicity of the right median nerve at the wrist was reduced.  The mobility of the right median nerve with flexion and extension of the fingers was reduced. Color Doppler of the right median nerve showed normal median nerve vascularity.  No abnormal tenosynovitis of the right flexor tendons was noted.    Using a longitudinal scan of the right median nerve at the wrist, a mild notch sign was seen under the flexor retinaculum.    A right persistent median artery was not present. A right bifid median nerve was present. No other abnormal mass or ganglia was appreciated in the right carpal tunnel. With extension of the fingers, there was no abnormal intrusion of the right flexor digitorum sublimis. With flexion of the fingers, there was no abnormal intrusion of the right lumbrical muscles.    In the mid-forearm, approximately 12 cm proximal to the distal wrist crease, the right median nerve was seen between the flexor digitorum sublimis and flexor digitorum profundus muscles. At the mid-forearm, the right median nerve CSA was 6.1 mm2. The ratio of the right median CSAs between the wrist and forearm (WFR) was 6.3 (NL < 1.5; borderline: 1.5 - 2.0). The echogenicity of the right median nerve in the forearm was normal.    The right median nerve was then followed proximal into the forearm and then to the axilla. The median nerve was normal in size and echogenicity adjacent to the brachial artery.     Scanning the muscles, the echogenicity was normal of the flexor digitorum sublimis, flexor digitorum profundus, flexor pollicis longus, flexor carpi radialis, and pronator teres. The echogenicity of the abductor pollicis brevis was increased.      IMPRESSION:  This is an abnormal neuromuscular ultrasound examination of the right upper extremity.     There was ultrasound evidence of a moderate to severe median neuropathy at the right wrist.  Please note interpretation of neuromuscular ultrasound following carpal tunnel release surgery should be done with caution due to enlargement of the  nerve, epineurial thickening, and other abnormalities which can be seen in normal recovering patients.  However in the case of this study, the nerve is markedly enlarged with a mild notch sign suggesting ongoing median nerve compression.      The median nerve in the distal wrist crease was bifid, however there is no persistent median artery noted.    In addition, the median nerve was followed through its anatomic course in the limb from wrist to axilla and was normal above the wrist.

## 2024-12-09 ENCOUNTER — TELEPHONE (OUTPATIENT)
Dept: PRIMARY CARE | Facility: CLINIC | Age: 54
End: 2024-12-09
Payer: COMMERCIAL

## 2024-12-10 DIAGNOSIS — K21.9 GASTROESOPHAGEAL REFLUX DISEASE WITHOUT ESOPHAGITIS: Primary | ICD-10-CM

## 2024-12-10 RX ORDER — PANTOPRAZOLE SODIUM 40 MG/1
40 TABLET, DELAYED RELEASE ORAL DAILY
Qty: 30 TABLET | Refills: 5 | Status: SHIPPED | OUTPATIENT
Start: 2024-12-10 | End: 2025-06-08

## 2024-12-18 ENCOUNTER — OFFICE VISIT (OUTPATIENT)
Dept: ORTHOPEDIC SURGERY | Facility: CLINIC | Age: 54
End: 2024-12-18
Payer: COMMERCIAL

## 2024-12-18 DIAGNOSIS — G56.01 CARPAL TUNNEL SYNDROME OF RIGHT WRIST: Primary | ICD-10-CM

## 2024-12-18 DIAGNOSIS — G56.01 CARPAL TUNNEL SYNDROME ON RIGHT: ICD-10-CM

## 2024-12-18 PROCEDURE — 99214 OFFICE O/P EST MOD 30 MIN: CPT | Performed by: ORTHOPAEDIC SURGERY

## 2024-12-18 PROCEDURE — 1036F TOBACCO NON-USER: CPT | Performed by: ORTHOPAEDIC SURGERY

## 2024-12-18 NOTE — LETTER
December 22, 2024     Sue Moyer MD  [address] [facility] [address]  [address]  [facility] [address]    Patient: Alejandra Rascon   YOB: 1970   Date of Visit: 12/18/2024       Dear Dr. Sue Moyer MD:    Thank you for referring Alejandra Rascon to me for evaluation. Below are my notes for this consultation.  If you have questions, please do not hesitate to call me. I look forward to following your patient along with you.       Sincerely,     Kermit Ulrich MD      CC: No Recipients  ______________________________________________________________________________________    CHIEF COMPLAINT         NMUS results    ASSESSMENT + PLAN    Right carpal tunnel syndrome, recurrent after previous surgery, with positive NMUS    We had a long discussion about the complex nature of this condition and the multitude of options for treatment.  I reviewed the option of revision carpal tunnel surgery along with placement of a scar barrier.  I discussed the major risks and benefits of that surgery.  Patient was interested in proceeding with this and will contact the office to set up an exact surgical date.  Surgery will be done at the location of her convenience, under sedation and local.    Continue with the current splint.  Contact my office with any additional concerns in the meantime.        HISTORY OF PRESENT ILLNESS       Patient returns today, 6 weeks after last visit, having obtained NMUS to evaluate her right carpal tunnel for evidence of recurrent compression after remote carpal tunnel release surgery by me in December of 2022.  She has been using her night splint over the last 6 weeks and that has been improving her nocturnal symptoms.  Daytime symptoms are still bothersome.  No other new concerns.      PHYSICAL EXAM       She remains well-developed, well-nourished female in no acute distress.  She appears her stated age and has a pleasant affect.  Skin of the right hand and wrist remains intact with no erythema,  ecchymosis, or diffuse swelling.  Normal skin drag and coloration.  Incision is well-healed and nontender.  Negative Tinel and Phalen.  Durkan remains equivocal today.  Negative reverse Phalen.  5/5 APB and hand intrinsics with no wasting.  Sensation intact to light touch in all distributions.  Capillary refill less than 2 seconds.      IMAGING / LABS / EMGs    NMUS from November 13 was reviewed and is significantly abnormal.  Incidental note is made of a bifid median nerve, a common anatomic variant.  Interestingly, there is not an associated median artery on the Doppler.  CSA is significantly increased at 38.5.  There is decreased echogenicity and decreased glide suggesting tethering by scar.  There is a mild notch sign indicating recurrent compression.  No other abnormal masses.  WFR size ratio is clearly abnormal at 6.3.      SURGICAL INDICATION    I reviewed the options for further management of this condition and the likely success rates of each.  The patient feels that they have maximized the benefits of conservative care, and they do want to go on to surgery.    I reviewed the major risks of surgery including infection; scarring; damage to nerves, tendons, or vessels; stiffness; failure to relieve symptoms, recurrent symptoms, pillar pain, scar barrier material reaction, and wound healing problems, as well as anesthesia risks.  I answered their questions to their satisfaction.  They were given my contact information and will contact the office when they are ready to schedule an exact surgical date.  Surgery will be posted as follows :    Dx :          Right carpal tunnel syndrome  ICD-10 :      G56.01  Procedure :     Revision right carpal tunnel release with possible scar barrier placement  CPT :        00915  Anesth :    MAC  Location :   [facility]  Duration :    60 minutes  Specials :    Tawana 10 x 40 scar barrier  PAT :       No  Post-Op Visit :    10-15 days        Electronically Signed      IVETTE  Tevin Ulrich MD      Orthopaedic Hand Surgery      [phone]

## 2024-12-22 ENCOUNTER — HOSPITAL ENCOUNTER (OUTPATIENT)
Facility: CLINIC | Age: 54
Setting detail: OUTPATIENT SURGERY
End: 2024-12-22
Attending: ORTHOPAEDIC SURGERY | Admitting: ORTHOPAEDIC SURGERY
Payer: COMMERCIAL

## 2025-01-21 ENCOUNTER — OFFICE VISIT (OUTPATIENT)
Dept: HEMATOLOGY/ONCOLOGY | Facility: HOSPITAL | Age: 55
End: 2025-01-21
Payer: COMMERCIAL

## 2025-01-21 ENCOUNTER — LAB (OUTPATIENT)
Dept: LAB | Facility: HOSPITAL | Age: 55
End: 2025-01-21
Payer: COMMERCIAL

## 2025-01-21 ENCOUNTER — HOSPITAL ENCOUNTER (OUTPATIENT)
Dept: RADIOLOGY | Facility: HOSPITAL | Age: 55
Discharge: HOME | End: 2025-01-21
Payer: COMMERCIAL

## 2025-01-21 VITALS
HEART RATE: 118 BPM | BODY MASS INDEX: 27.11 KG/M2 | WEIGHT: 167.99 LBS | SYSTOLIC BLOOD PRESSURE: 121 MMHG | OXYGEN SATURATION: 100 % | RESPIRATION RATE: 18 BRPM | DIASTOLIC BLOOD PRESSURE: 85 MMHG | TEMPERATURE: 96.8 F

## 2025-01-21 DIAGNOSIS — C81.00 NODULAR LYMPHOCYTE PREDOMINANT HODGKIN LYMPHOMA, UNSPECIFIED BODY REGION (MULTI): ICD-10-CM

## 2025-01-21 DIAGNOSIS — R68.89 COLD INTOLERANCE: ICD-10-CM
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
## 2025-01-21 LAB
ALBUMIN SERPL BCP-MCNC: 4.3 G/DL (ref 3.4–5)
ALP SERPL-CCNC: 80 U/L (ref 33–110)
ALT SERPL W P-5'-P-CCNC: 14 U/L (ref 7–45)
ANION GAP SERPL CALC-SCNC: 14 MMOL/L (ref 10–20)
AST SERPL W P-5'-P-CCNC: 16 U/L (ref 9–39)
BASOPHILS # BLD AUTO: 0.01 X10*3/UL (ref 0–0.1)
BASOPHILS NFR BLD AUTO: 0.3 %
BILIRUB SERPL-MCNC: 1.1 MG/DL (ref 0–1.2)
BUN SERPL-MCNC: 19 MG/DL (ref 6–23)
CALCIUM SERPL-MCNC: 9.8 MG/DL (ref 8.6–10.3)
CHLORIDE SERPL-SCNC: 103 MMOL/L (ref 98–107)
CO2 SERPL-SCNC: 27 MMOL/L (ref 21–32)
CREAT SERPL-MCNC: 0.84 MG/DL (ref 0.5–1.05)
EGFRCR SERPLBLD CKD-EPI 2021: 83 ML/MIN/1.73M*2
EOSINOPHIL # BLD AUTO: 0.18 X10*3/UL (ref 0–0.7)
EOSINOPHIL NFR BLD AUTO: 5.4 %
ERYTHROCYTE [DISTWIDTH] IN BLOOD BY AUTOMATED COUNT: 15.3 % (ref 11.5–14.5)
GLUCOSE SERPL-MCNC: 71 MG/DL (ref 74–99)
HCT VFR BLD AUTO: 36.1 % (ref 36–46)
HGB BLD-MCNC: 11.5 G/DL (ref 12–16)
IGA SERPL-MCNC: 190 MG/DL (ref 70–400)
IGG SERPL-MCNC: 840 MG/DL (ref 700–1600)
IGM SERPL-MCNC: 47 MG/DL (ref 40–230)
IMM GRANULOCYTES # BLD AUTO: 0.01 X10*3/UL (ref 0–0.7)
IMM GRANULOCYTES NFR BLD AUTO: 0.3 % (ref 0–0.9)
LDH SERPL L TO P-CCNC: 150 U/L (ref 84–246)
LYMPHOCYTES # BLD AUTO: 0.98 X10*3/UL (ref 1.2–4.8)
LYMPHOCYTES NFR BLD AUTO: 29.2 %
MCH RBC QN AUTO: 28.8 PG (ref 26–34)
MCHC RBC AUTO-ENTMCNC: 31.9 G/DL (ref 32–36)
MCV RBC AUTO: 90 FL (ref 80–100)
MONOCYTES # BLD AUTO: 0.28 X10*3/UL (ref 0.1–1)
MONOCYTES NFR BLD AUTO: 8.3 %
NEUTROPHILS # BLD AUTO: 1.9 X10*3/UL (ref 1.2–7.7)
NEUTROPHILS NFR BLD AUTO: 56.5 %
NRBC BLD-RTO: 0 /100 WBCS (ref 0–0)
PLATELET # BLD AUTO: 229 X10*3/UL (ref 150–450)
POTASSIUM SERPL-SCNC: 3.7 MMOL/L (ref 3.5–5.3)
PROT SERPL-MCNC: 7.2 G/DL (ref 6.4–8.2)
RBC # BLD AUTO: 4 X10*6/UL (ref 4–5.2)
SODIUM SERPL-SCNC: 140 MMOL/L (ref 136–145)
TSH SERPL-ACNC: 1.33 MIU/L (ref 0.44–3.98)
WBC # BLD AUTO: 3.4 X10*3/UL (ref 4.4–11.3)

## 2025-01-21 PROCEDURE — 84443 ASSAY THYROID STIM HORMONE: CPT

## 2025-01-21 PROCEDURE — 83615 LACTATE (LD) (LDH) ENZYME: CPT

## 2025-01-21 PROCEDURE — 73030 X-RAY EXAM OF SHOULDER: CPT | Mod: LT

## 2025-01-21 PROCEDURE — 82784 ASSAY IGA/IGD/IGG/IGM EACH: CPT

## 2025-01-21 PROCEDURE — 36415 COLL VENOUS BLD VENIPUNCTURE: CPT

## 2025-01-21 PROCEDURE — 99215 OFFICE O/P EST HI 40 MIN: CPT | Performed by: INTERNAL MEDICINE

## 2025-01-21 PROCEDURE — 80053 COMPREHEN METABOLIC PANEL: CPT

## 2025-01-21 PROCEDURE — 3079F DIAST BP 80-89 MM HG: CPT | Performed by: INTERNAL MEDICINE

## 2025-01-21 PROCEDURE — 85025 COMPLETE CBC W/AUTO DIFF WBC: CPT

## 2025-01-21 PROCEDURE — 3074F SYST BP LT 130 MM HG: CPT | Performed by: INTERNAL MEDICINE

## 2025-01-21 ASSESSMENT — PAIN SCALES - GENERAL: PAINLEVEL_OUTOF10: 6

## 2025-01-21 NOTE — PROGRESS NOTES
Patient ID: Alejandra Rascon is a 54 y.o. female.    Subjective    7/16/24; Patient with nodular lymphocyte predominant Hodgkin's Lymphoma is here for routine follow up.   S/p 6 cycles R-CHOP and post Rx CT/PET showed CR.   Doing very well. Retired now. No new c/o. Neuropathy minimum.  10/22/24: doing well. No new c/o  1/21/25: [place] [email or web address]. C/o Left shoulder pain and unable to raise arm. Taking Tylenol      Review of Systems   All other systems reviewed and are negative.     PMHx: chronic LBP with herniated disc at L5-S1 causing sciatica down her right leg.  PSHX; c-sction     FHx; Mother with breast cancer, father with colon cancer, her maternal aunt with uterine ca and breat ca.     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   HENT:      Head: Normocephalic and atraumatic.      Mouth/Throat:      Mouth: Mucous membranes are moist.      Pharynx: Oropharynx is clear.   Eyes:      Extraocular Movements: Extraocular movements intact.      Pupils: Pupils are equal, round, and reactive to light.   Cardiovascular:      Rate and Rhythm: Normal rate and regular rhythm.      Heart sounds: No murmur heard.  Pulmonary:      Effort: Pulmonary effort is normal.      Breath sounds: Normal breath sounds.   Abdominal:      General: Abdomen is flat. Bowel sounds are normal.      Palpations: Abdomen is soft.   Musculoskeletal:      Cervical back: Normal range of motion and neck supple.      Right lower leg: No edema.      Left lower leg: No edema.      Comments: Limited ROM left shoulder 2/2 pain. No focal tenderness on  palpation   Skin:     General: Skin is warm and dry.   Neurological:      General: No focal deficit present.      Mental Status: She is alert.   Psychiatric:         Mood and Affect: Mood normal.         Behavior: Behavior normal.         Judgment: Judgment normal.       === 02/23/24 ===    NM PET CT LYMPHOMA STAGING    - Impression -  1. Normal-appearing external iliac and inguinal lymph nodes with mild  hypermetabolic activity which is below blood pool. Deauville 2.  2. Postsurgical changes of right inguinal lymph node excisional  biopsy.  3. No evidence of extranodal hypermetabolic disease    Performance Status:  Asymptomatic      Assessment/Plan   Assessment:   Patient with newly diagnosed nodular lymphocyte predominant Hodgkin's Lymphoma is here for routine follow up.    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  - skip Vincristine for cycle 5 and C6   - repeat right CTS repair next week.     LBP  - cont current meds    HTN    Fhx heart disease     RTC 3 months.           Cancer Staging   No matching staging information was found for the patient.      Oncology History Overview Note   NLPHL(Nodular Lymphocyte predominant B cell Lymphoma)  Patient presented with chronic back pain and MRI 7/11/23 showed DJD and worsening retroperitoneal LN's. CT/PET on 7/17/23 confirmed FDG avid right inguinal, ileac and retroperitoneal LN's. Excisional  right inguinal LN  biopsy 8/14/23 c/w nodular lymphocyte predominant B-cell Lymphoma(NLPHL) cd20+, cd10, cyclin D1 negative.    Treatment  R-CHOP x 6 cycles: 9/20/23- 1/9/2024(Vincristine was omitted 2/2 neuropathy C5 and C6)       Nodular lymphocyte predominant Hodgkin lymphoma   9/20/2023 - 1/23/2024 Chemotherapy    R-CHOP (Cyclophosphamide / DOXOrubicin / VinCRIStine / PredniSONE) + RiTUXimab, 21 Day Cycles     9/27/2023 Initial Diagnosis    Nodular lymphocyte predominant Hodgkin lymphoma (CMS/HCC)            Erlinda Boyd MD

## 2025-01-21 NOTE — PROGRESS NOTES
Pt here for follow up appointment. Pt stated she had recent fall on ice this past Sunday, did not hit head but did hit left shoulder. Pt stated that she did not want to go to the ED due to too many people and did not go to urgent care. She has carpel tunnel surgery on 1/27. Pt denies chest pain and shortness of breath. Dr. Boyd aware, ordered Xray for today.

## 2025-01-29 ENCOUNTER — OFFICE VISIT (OUTPATIENT)
Dept: ORTHOPEDIC SURGERY | Facility: CLINIC | Age: 55
End: 2025-01-29
Payer: COMMERCIAL

## 2025-01-29 NOTE — PROGRESS NOTES
CHIEF COMPLAINT         Left hand pain    ASSESSMENT + PLAN     ***        HISTORY OF PRESENT ILLNESS       Patient is a 54 y.o. ***-hand dominant female ***, who presents today for evaluation of ***      REVIEW OF SYSTEMS       A 30-item multi-system Review Of Systems was obtained on today's intake form.  This was reviewed with the patient and is correct.  The pertinent positives and negatives are listed above.  The form has been scanned separately into the medical record.      PHYSICAL EXAM    Constitutional:    Appears stated age. Well-developed and well-nourished ***.  Psychiatric:         Pleasant normal mood and affect. Behavior is appropriate for the situation.   Head:                   Normocephalic and atraumatic.  Eyes:                    Pupils are equal and round.  Cardiovascular:  2+ radial and ulnar pulses. Fingers well-perfused.  Respiratory:        Effort normal. No respiratory distress. Speaking in complete sentences.  Neurologic:       Alert and oriented to person, place, and time.  Skin:                Skin is intact, warm and dry.  Hematologic / Lymphatic:    No lymphedema or lymphangitis.    Extremities / Musculoskeletal:                      ***      IMAGING / LABS / EMGs           ***      Past Medical History:   Diagnosis Date    Abdominal obesity 10/21/2024    Abdominal obesity and metabolic syndrome 10/07/2023    Anesthesia of skin 03/10/2017    Numbness of lip    Body mass index (BMI)40.0-44.9, adult 05/26/2021    BMI 40.0-44.9, adult    Cervical adenopathy 10/07/2023    Elevated blood-pressure reading, without diagnosis of hypertension 03/10/2017    Elevated BP without diagnosis of hypertension    Encounter for gynecological examination (general) (routine) without abnormal findings 08/29/2017    Well woman exam    Encounter for immunization 04/23/2018    Need for Tdap vaccination    Encounter for other screening for malignant neoplasm of breast 06/15/2018    Screening for breast cancer     Encounter for screening for infections with a predominantly sexual mode of transmission 06/30/2016    Screening for STD (sexually transmitted disease)    Encounter for screening for malignant neoplasm of cervix 08/29/2017    Screening for cervical cancer    Exposure to severe acute respiratory syndrome coronavirus 2 (SARS-CoV-2) 10/21/2024    GERD (gastroesophageal reflux disease)     Health maintenance examination 06/14/2023    Hx antineoplastic chemo Sept 2023-Jan 2024    Hypertension     Injury caused by twisting with repetitive movement 02/13/2019    Localized enlarged lymph nodes 04/30/2015    Enlarged lymph nodes in armpit    Localized enlarged lymph nodes 08/22/2014    Cervical adenopathy    Lymphadenopathy, inguinal 10/07/2023    Morbid (severe) obesity due to excess calories (Multi) 06/07/2022    Class 2 severe obesity with serious comorbidity and body mass index (BMI) of 39.0 to 39.9 in adult    Morbid obesity (Multi) 10/21/2024    Nodular lymphocyte predominant Hodgkin lymphoma     Other specified health status 11/13/2014    Contraception    Personal history of other diseases of the musculoskeletal system and connective tissue 04/11/2014    History of low back pain    Personal history of other specified conditions 02/14/2019    History of palpitations    Personal history of other specified conditions 02/11/2019    History of paresthesia    Personal history of other specified conditions 08/22/2014    History of headache    Polyphagia 10/07/2023    Post-menopausal bleeding 06/14/2023    Post-traumatic headache 10/07/2023    Comment on above: Added by Problem List Migration; 2013-2-19; Moved to [facility] Nov 24 2013 4:45PM;      Postconcussion syndrome 10/07/2023    Retroperitoneal lymphadenopathy 10/07/2023       Medication Documentation Review Audit       Reviewed by Beatriz Taylor RN (Registered Nurse) on 01/21/25 at 1229      Medication Order Taking? Sig Documenting Provider Last Dose Status    amLODIPine (Norvasc) 5 mg tablet [id number]  Take 1 tablet (5 mg) by mouth once daily. Sue Moyer MD  Active   biotin 5 mg tablet [id number] No Take 1 tablet (5 mg) by mouth once daily.   Patient not taking: Reported on 1/15/2025    Namrata Castillo MD Not Taking Active   CeleBREX 100 mg capsule [id number] No Take by mouth. Namrata Castillo MD Past Week Active   cyclobenzaprine (Flexeril) 10 mg tablet [id number] No Take 1 tablet (10 mg) by mouth if needed.   Patient not taking: Reported on 1/15/2025    Namrata Castillo MD Not Taking Active   gabapentin (Neurontin) 300 mg capsule [id number] No TAKE ONE CAPSULE BY MOUTH THREE TIMES A DAY Antimo P MD Adrianne Past Month Active   meclizine (Antivert) 25 mg tablet [id number] No Take by mouth. Namrata Castillo MD More than a month Active   multivitamin tablet [id number] No Take by mouth. Namrata Castillo MD Past Week Active   pantoprazole (ProtoNix) 40 mg EC tablet [id number]  Take 1 tablet (40 mg) by mouth once daily. Do not crush, chew, or split. Sue Moyer MD  Active   triamcinolone (Kenalog) 0.025 % ointment [id number] No Triamcinolone Acetonide 0.025 % External Ointment  Refills: 0  Start: 7-May-2020 Namrata Castillo MD Not Taking Active   Zepbound 15 mg/0.5 mL injection [id number]  INJECT 15 MG SUBCUTANEOUSLY EVERY WEEK. PLEASE DO NOT FILL AFTER 30 DAYS IF NOT PICKED UP. Historical Provider, MD  Active                    Allergies   Allergen Reactions    Hydrocodone Rash     states OK to take tylenol       Social History     Socioeconomic History    Marital status:      Spouse name: Not on file    Number of children: Not on file    Years of education: Not on file    Highest education level: Not on file   Occupational History    Not on file   Tobacco Use    Smoking status: Never    Smokeless tobacco: Never   Vaping Use    Vaping status: Never Used   Substance and Sexual Activity    Alcohol use: Yes     Comment: rarely    Drug use: Never    Sexual  activity: Not on file   Other Topics Concern    Not on file   Social History Narrative    Not on file     Social Drivers of Health     Financial Resource Strain: Not on file   Food Insecurity: Not on file   Transportation Needs: Not on file   Physical Activity: Not on file   Stress: Not on file   Social Connections: Not on file   Intimate Partner Violence: Not on file   Housing Stability: Not on file       Past Surgical History:   Procedure Laterality Date     SECTION, CLASSIC  2014     Section         Electronically Signed      IVETTE Ulrich MD      Orthopaedic Hand Surgery      [phone]

## 2025-01-31 ENCOUNTER — OFFICE VISIT (OUTPATIENT)
Dept: ORTHOPEDIC SURGERY | Facility: CLINIC | Age: 55
End: 2025-01-31
Payer: COMMERCIAL

## 2025-01-31 VITALS — WEIGHT: 166 LBS | HEIGHT: 66 IN | BODY MASS INDEX: 26.68 KG/M2

## 2025-01-31 DIAGNOSIS — S46.012A TRAUMATIC INCOMPLETE TEAR OF LEFT ROTATOR CUFF, INITIAL ENCOUNTER: Primary | ICD-10-CM

## 2025-01-31 PROCEDURE — 99214 OFFICE O/P EST MOD 30 MIN: CPT | Performed by: PHYSICIAN ASSISTANT

## 2025-01-31 PROCEDURE — 1036F TOBACCO NON-USER: CPT | Performed by: PHYSICIAN ASSISTANT

## 2025-01-31 PROCEDURE — 3008F BODY MASS INDEX DOCD: CPT | Performed by: PHYSICIAN ASSISTANT

## 2025-01-31 ASSESSMENT — PAIN - FUNCTIONAL ASSESSMENT: PAIN_FUNCTIONAL_ASSESSMENT: 0-10

## 2025-01-31 ASSESSMENT — PAIN SCALES - GENERAL: PAINLEVEL_OUTOF10: 6

## 2025-01-31 NOTE — PROGRESS NOTES
Subjective    Patient ID: Alejandra Rascon is a 54 y.o. female.    Chief Complaint: Injury of the Left Shoulder (DOI 1/19/25: Fell on ice)           HPI:  Alejandra Rascon is a 54 y.o. female who presents today for evaluation of her left shoulder.  On 1/19/2025 she slipped and fell on ice, landing hard on her left side.  She had immediate sharp pain throughout her shoulder.  She noted weakness when trying to lift her arm away from her body.  She spoke to her oncologist who referred her for x-rays which have been completed.  She has been utilizing a sling.  She states she has not seen any improvement in her symptoms.  Prior to this injury her shoulder had been functioning normally.  She has been using ibuprofen without any improvement.    ROS  Constitutional: No fever, no chills, not feeling tired, no recent weight gain and no recent weight loss  ENT: No nosebleeds  Cardiovascular: No chest pain  Respiratory: No shortness of breath and no cough  Gastrointestinal: No abdominal pain, no nausea, no diarrhea, and no vomiting  Musculoskeletal: No arthralgias  Integumentary: No rashes and no skin lesions  Neurological: No headache  Psychiatric: No sleep disturbances no depression  Endocrine: No muscle weakness and no muscle cramps  Hematologic/lymphatic: No swelling glands and no tendency for easy bruising    Past Medical History:   Diagnosis Date    Abdominal obesity 10/21/2024    Abdominal obesity and metabolic syndrome 10/07/2023    Anesthesia of skin 03/10/2017    Numbness of lip    Body mass index (BMI)40.0-44.9, adult 05/26/2021    BMI 40.0-44.9, adult    Cervical adenopathy 10/07/2023    Elevated blood-pressure reading, without diagnosis of hypertension 03/10/2017    Elevated BP without diagnosis of hypertension    Encounter for gynecological examination (general) (routine) without abnormal findings 08/29/2017    Well woman exam    Encounter for immunization 04/23/2018    Need for Tdap vaccination    Encounter  for other screening for malignant neoplasm of breast 06/15/2018    Screening for breast cancer    Encounter for screening for infections with a predominantly sexual mode of transmission 2016    Screening for STD (sexually transmitted disease)    Encounter for screening for malignant neoplasm of cervix 2017    Screening for cervical cancer    Exposure to severe acute respiratory syndrome coronavirus 2 (SARS-CoV-2) 10/21/2024    GERD (gastroesophageal reflux disease)     Health maintenance examination 2023    Hx antineoplastic chemo 2023-2024    Hypertension     Injury caused by twisting with repetitive movement 2019    Localized enlarged lymph nodes 2015    Enlarged lymph nodes in armpit    Localized enlarged lymph nodes 2014    Cervical adenopathy    Lymphadenopathy, inguinal 10/07/2023    Morbid (severe) obesity due to excess calories (Multi) 2022    Class 2 severe obesity with serious comorbidity and body mass index (BMI) of 39.0 to 39.9 in adult    Morbid obesity (Multi) 10/21/2024    Nodular lymphocyte predominant Hodgkin lymphoma     Other specified health status 2014    Contraception    Personal history of other diseases of the musculoskeletal system and connective tissue 2014    History of low back pain    Personal history of other specified conditions 2019    History of palpitations    Personal history of other specified conditions 2019    History of paresthesia    Personal history of other specified conditions 2014    History of headache    Polyphagia 10/07/2023    Post-menopausal bleeding 2023    Post-traumatic headache 10/07/2023    Comment on above: Added by Problem List Migration; 2013; Moved to [facility] 2013 4:45PM;      Postconcussion syndrome 10/07/2023    Retroperitoneal lymphadenopathy 10/07/2023        Past Surgical History:   Procedure Laterality Date     SECTION, CLASSIC  2014      Section          Current Outpatient Medications:     amLODIPine (Norvasc) 5 mg tablet, Take 1 tablet (5 mg) by mouth once daily., Disp: 90 tablet, Rfl: 3    CeleBREX 100 mg capsule, Take by mouth., Disp: , Rfl:     gabapentin (Neurontin) 300 mg capsule, TAKE ONE CAPSULE BY MOUTH THREE TIMES A DAY, Disp: 90 capsule, Rfl: 0    meclizine (Antivert) 25 mg tablet, Take by mouth., Disp: , Rfl:     multivitamin tablet, Take by mouth., Disp: , Rfl:     pantoprazole (ProtoNix) 40 mg EC tablet, Take 1 tablet (40 mg) by mouth once daily. Do not crush, chew, or split., Disp: 30 tablet, Rfl: 5    triamcinolone (Kenalog) 0.025 % ointment, Triamcinolone Acetonide 0.025 % External Ointment Refills: 0  Start: 7-May-2020, Disp: , Rfl:     Zepbound 15 mg/0.5 mL injection, INJECT 15 MG SUBCUTANEOUSLY EVERY WEEK. PLEASE DO NOT FILL AFTER 30 DAYS IF NOT PICKED UP., Disp: , Rfl:      Allergies   Allergen Reactions    Hydrocodone Rash     states OK to take tylenol        Social Connections: Not on file          Objective   54-year-old female well appearing in no acute distress. Alert and oriented ×3.  Skin intact bilateral upper extremities.   Normal tandem gait. Coordination and balance intact.  Bilateral upper extremity compartments supple.  5 out of 5 distal motor strength bilaterally.  C2 through C7 sensation intact bilaterally.  2+ distal pulses bilaterally.  She has abduction to about 40 degrees.  Passively I can get her to 90 degrees.  She has pain and weakness with empty can on the left when compared to the right.  2 out of 5 strength with external rotation of the left when compared to the right.  3+ out of 5 strength with internal rotation on the right when compared to the left.  Pain and weakness with a belly press test on the left.  Minimal tenderness over the proximal biceps tendon.  No tenderness over the AC joint.    Image Results:  X-rays of the left shoulder dated 2025 were reviewed.  She has minimal  degenerative changes of the glenohumeral joint.  Evidence of potential anterior glenohumeral joint dislocation are present as there is a small fracture over the anterior aspect of the glenoid and a Hill-Sachs deformity of the humeral head.      Assessment/Plan   Encounter Diagnoses:  Traumatic incomplete tear of left rotator cuff, initial encounter    Orders Placed This Encounter    MR shoulder left wo IV contrast       I am concerned that she may have had a shoulder instability event and traumatically ruptured her left rotator cuff.  We are going to proceed with an MRI of the left shoulder to evaluate for a rotator cuff tear.  The MRI is medically indicated and necessary given her weakness both subjectively and on exam.  The MRI should be performed in a hospital setting so the surgeon has access to the images as it will be used for potential presurgical planning purposes.  She may continue with her sling.  She should apply ice to her shoulder for 10 to 15 minutes a couple times a day.  After the MRI has been completed she will contact the office and we will review the results with her by phone.    This office note was dictated using Dragon voice to text software and was not proofread for spelling or grammatical errors

## 2025-02-01 ENCOUNTER — APPOINTMENT (OUTPATIENT)
Dept: RADIOLOGY | Facility: HOSPITAL | Age: 55
End: 2025-02-01
Payer: COMMERCIAL

## 2025-02-01 ENCOUNTER — HOSPITAL ENCOUNTER (OUTPATIENT)
Dept: RADIOLOGY | Facility: HOSPITAL | Age: 55
Discharge: HOME | End: 2025-02-01
Payer: COMMERCIAL

## 2025-02-01 DIAGNOSIS — S46.012A TRAUMATIC INCOMPLETE TEAR OF LEFT ROTATOR CUFF, INITIAL ENCOUNTER: ICD-10-CM

## 2025-02-01 PROCEDURE — 73221 MRI JOINT UPR EXTREM W/O DYE: CPT | Mod: LT

## 2025-02-01 PROCEDURE — 73221 MRI JOINT UPR EXTREM W/O DYE: CPT | Mod: LEFT SIDE | Performed by: RADIOLOGY

## 2025-02-03 ENCOUNTER — DOCUMENTATION (OUTPATIENT)
Dept: ORTHOPEDIC SURGERY | Facility: HOSPITAL | Age: 55
End: 2025-02-03
Payer: COMMERCIAL

## 2025-02-03 NOTE — PROGRESS NOTES
I spoke to the patient regarding the MRI on her left shoulder.  Unfortunately did confirm the presence of a full-thickness rotator cuff tear as well as mild degenerative changes the glenohumeral joint.    We will arrange for her to have a surgical consultation with Dr. Galindo.  She verbalized understanding and agreed with this plan.

## 2025-02-05 ENCOUNTER — OFFICE VISIT (OUTPATIENT)
Dept: ORTHOPEDIC SURGERY | Facility: CLINIC | Age: 55
End: 2025-02-05
Payer: COMMERCIAL

## 2025-02-05 DIAGNOSIS — M75.102 TEAR OF LEFT ROTATOR CUFF, UNSPECIFIED TEAR EXTENT, UNSPECIFIED WHETHER TRAUMATIC: ICD-10-CM

## 2025-02-05 DIAGNOSIS — Z98.890 STATUS POST SURGERY: ICD-10-CM

## 2025-02-05 PROCEDURE — 99213 OFFICE O/P EST LOW 20 MIN: CPT | Performed by: STUDENT IN AN ORGANIZED HEALTH CARE EDUCATION/TRAINING PROGRAM

## 2025-02-05 ASSESSMENT — PAIN - FUNCTIONAL ASSESSMENT: PAIN_FUNCTIONAL_ASSESSMENT: 0-10

## 2025-02-05 ASSESSMENT — PAIN SCALES - GENERAL: PAINLEVEL_OUTOF10: 7

## 2025-02-05 ASSESSMENT — PAIN DESCRIPTION - DESCRIPTORS: DESCRIPTORS: HEADACHE;ACHING

## 2025-02-06 NOTE — PROGRESS NOTES
PRIMARY CARE PHYSICIAN: Sue Moyer MD  REFERRING PROVIDER: No referring provider defined for this encounter.     CONSULT ORTHOPAEDIC: Shoulder Evaluation    ASSESSMENT & PLAN    Impression/Plan: This is a 54-year-old female presenting for evaluation of acute left shoulder pain.  Based on clinical history, physical examination, and MRI imaging she has a acute left rotator cuff tear with subacromial impingement and biceps tendinitis.  At this time, based on the patient's young age, functional status, and acute nature of the injury I recommended acute surgical intervention with rotator cuff repair, subacromial decompression, and possible subpectoral biceps tenodesis.  We discussed risk the procedure include but are not limited to infection, nerve damage, blood loss, persistent pain, need for further surgery, failure of repair, blood clot, and death.  She is in understanding of these risks would like to move forward with surgery.  All questions were answered.  Will work on a surgical date.    SUBJECTIVE  CHIEF COMPLAINT:   Chief Complaint   Patient presents with    Left Shoulder - Pain     NPV. Fell on ice 1-19-25    Results     MRI 2-1-25        HPI: Alejandra Rascon is a 54 y.o. patient. Alejandra Rascon presents for evaluation of acute left shoulder pain.  On 1/19/2025 she slipped and fell on the ice.  She denies any dislocation event at that time.  She denies any previous history of dislocation events.  She feels that since that time she has experienced decreased motion, weakness, and pain to the left shoulder.  She has been utilizing a sling for pain control.  No previous history of surgical intervention or injections to the left shoulder.  There are no reports of distal numbness or tingling.    REVIEW OF SYSTEMS  Constitutional: See HPI for pain assessment, No significant weight loss, recent trauma  Cardiovascular: No chest pain, shortness of breath  Respiratory: No difficulty breathing,  cough  Gastrointestinal: No nausea, vomiting, diarrhea, constipation  Musculoskeletal: Noted in HPI, positive for pain, restricted motion, stiffness  Integumentary: No rashes, easy bruising, redness   Neurological: no numbness or tingling in extremities, no gait disturbances   Psychiatric: No mood changes, memory changes, social issues  Heme/Lymph: no excessive swelling, easy bruising, excessive bleeding  ENT: No hearing changes  Eyes: No vision changes    Past Medical History:   Diagnosis Date    Abdominal obesity 10/21/2024    Abdominal obesity and metabolic syndrome 10/07/2023    Anesthesia of skin 03/10/2017    Numbness of lip    Body mass index (BMI)40.0-44.9, adult 05/26/2021    BMI 40.0-44.9, adult    Cervical adenopathy 10/07/2023    Elevated blood-pressure reading, without diagnosis of hypertension 03/10/2017    Elevated BP without diagnosis of hypertension    Encounter for gynecological examination (general) (routine) without abnormal findings 08/29/2017    Well woman exam    Encounter for immunization 04/23/2018    Need for Tdap vaccination    Encounter for other screening for malignant neoplasm of breast 06/15/2018    Screening for breast cancer    Encounter for screening for infections with a predominantly sexual mode of transmission 06/30/2016    Screening for STD (sexually transmitted disease)    Encounter for screening for malignant neoplasm of cervix 08/29/2017    Screening for cervical cancer    Exposure to severe acute respiratory syndrome coronavirus 2 (SARS-CoV-2) 10/21/2024    GERD (gastroesophageal reflux disease)     Health maintenance examination 06/14/2023    Hx antineoplastic chemo Sept 2023-Jan 2024    Hypertension     Injury caused by twisting with repetitive movement 02/13/2019    Localized enlarged lymph nodes 04/30/2015    Enlarged lymph nodes in armpit    Localized enlarged lymph nodes 08/22/2014    Cervical adenopathy    Lymphadenopathy, inguinal 10/07/2023    Morbid (severe)  obesity due to excess calories (Multi) 2022    Class 2 severe obesity with serious comorbidity and body mass index (BMI) of 39.0 to 39.9 in adult    Morbid obesity (Multi) 10/21/2024    Nodular lymphocyte predominant Hodgkin lymphoma     Other specified health status 2014    Contraception    Personal history of other diseases of the musculoskeletal system and connective tissue 2014    History of low back pain    Personal history of other specified conditions 2019    History of palpitations    Personal history of other specified conditions 2019    History of paresthesia    Personal history of other specified conditions 2014    History of headache    Polyphagia 10/07/2023    Post-menopausal bleeding 2023    Post-traumatic headache 10/07/2023    Comment on above: Added by Problem List Migration; 2013; Moved to [facility] 2013 4:45PM;      Postconcussion syndrome 10/07/2023    Retroperitoneal lymphadenopathy 10/07/2023        Allergies   Allergen Reactions    Hydrocodone Rash     states OK to take tylenol        Past Surgical History:   Procedure Laterality Date     SECTION, CLASSIC  2014     Section        Family History   Problem Relation Name Age of Onset    Breast cancer Mother Renay 49    Colon cancer Father Agus     Breast cancer Maternal Grandmother Leeanne 35    Breast cancer Mother's Sister      Ovarian cancer Mother's Sister      Breast cancer Father's Sister          Social History     Socioeconomic History    Marital status:      Spouse name: Not on file    Number of children: Not on file    Years of education: Not on file    Highest education level: Not on file   Occupational History    Not on file   Tobacco Use    Smoking status: Never    Smokeless tobacco: Never   Vaping Use    Vaping status: Never Used   Substance and Sexual Activity    Alcohol use: Yes     Comment: rarely    Drug use: Never    Sexual activity: Not on  "file   Other Topics Concern    Not on file   Social History Narrative    Not on file     Social Drivers of Health     Financial Resource Strain: Not on file   Food Insecurity: Not on file   Transportation Needs: Not on file   Physical Activity: Not on file   Stress: Not on file   Social Connections: Not on file   Intimate Partner Violence: Not on file   Housing Stability: Not on file        CURRENT MEDICATIONS:   Current Outpatient Medications   Medication Sig Dispense Refill    amLODIPine (Norvasc) 5 mg tablet Take 1 tablet (5 mg) by mouth once daily. 90 tablet 3    CeleBREX 100 mg capsule Take by mouth.      gabapentin (Neurontin) 300 mg capsule TAKE ONE CAPSULE BY MOUTH THREE TIMES A DAY 90 capsule 0    meclizine (Antivert) 25 mg tablet Take by mouth.      multivitamin tablet Take by mouth.      pantoprazole (ProtoNix) 40 mg EC tablet Take 1 tablet (40 mg) by mouth once daily. Do not crush, chew, or split. 30 tablet 5    triamcinolone (Kenalog) 0.025 % ointment Triamcinolone Acetonide 0.025 % External Ointment  Refills: 0  Start: 7-May-2020      Zepbound 15 mg/0.5 mL injection INJECT 15 MG SUBCUTANEOUSLY EVERY WEEK. PLEASE DO NOT FILL AFTER 30 DAYS IF NOT PICKED UP.       No current facility-administered medications for this visit.        OBJECTIVE    PHYSICAL EXAM      8/22/2024    11:42 AM 8/22/2024    12:00 PM 8/22/2024    12:15 PM 10/22/2024    12:08 PM 10/23/2024     1:01 PM 1/21/2025    12:30 PM 1/31/2025     9:14 AM   Vitals   Systolic 102 108 123 133 112 121    Diastolic 67 70 81 89 70 85    BP Location Left arm Left arm Left arm  Right arm     Heart Rate 96 76 71 91 81 118    Temp 36.2 °C (97.2 °F) 36.1 °C (97 °F) 36.1 °C (97 °F) 36 °C (96.8 °F) 36.4 °C (97.5 °F) 36 °C (96.8 °F)    Resp 14 14 18 16  18    Height     1.676 m (5' 6\")  1.676 m (5' 6\")   Weight (lb)    181.44 181.6 167.99 166   BMI    29.28 kg/m2 29.31 kg/m2 27.11 kg/m2 26.79 kg/m2   BSA (m2)    1.96 m2 1.96 m2 1.88 m2 1.87 m2   Visit Report "    Report Report Report Report      There is no height or weight on file to calculate BMI.    GENERAL: A/Ox3, NAD. Appears healthy, well nourished  PSYCHIATRIC: Mood stable, appropriate memory recall  EYES: EOM intact, no scleral icterus  CARDIAC: regular rate  LUNGS: Breathing non-labored  SKIN: no erythema, rashes, or ecchymoses     MUSCULOSKELETAL:  This is a well-appearing patient in no acute distress.    There is no tenderness to palpation along the SC joint, AC joint, clavicle, acromion, or spine of the scapula.  There is significant tenderness along the proximal aspect of the biceps tendon.  Active range of motion: forward flexion 80 degrees, abduction 60 degrees, external rotation 10 degrees, internal rotation to back pocket.  Strength: 3 /5 to the supraspinatus, infraspinatus, 5/5 subscapularis.  Negative Hornblower's.  Stability: Anterior/Posterior load and shift stable  Positive Speed's and Yergason's.  Positive Washington's.  Positive Neer and Molina.  The patient is firing the AIN/PIN/median/radial/ulnar/axillary distributions.  The patient is sensate to light touch in the median/radial/ulnar/axillary distributions.  2+ radial pulse.    NEUROVASCULAR:  - Neurovascular Status: sensation intact to light touch distally, upper extremity motor grossly intact  - Capillary refill brisk at extremities, radial pulse 2+    Imaging: Multiple views of the affected left shoulder(s) demonstrate: There is no evidence of acute fracture or dislocation.  Well-preserved glenohumeral joint spacing.  Normal acromiohumeral interval.  There is evidence of a unknown chronicity Hill-Sachs impaction fracture and possible bony lesion over the anterior glenoid.   X-rays were personally reviewed and interpreted by me.  Radiology reports were reviewed by me as well, if readily available at the time.    MRI of the left shoulder dated 2/1/2025 reveals full-thickness tearing of the distal supraspinatus and infraspinatus tendons roughly  3.5 cm in the anterior posterior direction with retraction to the acromion.  Tendinopathy of the long head of the biceps tendon.    Bret Galindo MD, MPH  Attending Surgeon  Sports Medicine Orthopaedic Surgery  [facility] [place]

## 2025-02-11 ENCOUNTER — APPOINTMENT (OUTPATIENT)
Dept: ORTHOPEDIC SURGERY | Facility: CLINIC | Age: 55
End: 2025-02-11
Payer: COMMERCIAL

## 2025-02-18 ENCOUNTER — APPOINTMENT (OUTPATIENT)
Dept: PRIMARY CARE | Facility: CLINIC | Age: 55
End: 2025-02-18
Payer: COMMERCIAL

## 2025-02-24 DIAGNOSIS — Z98.890 STATUS POST SURGERY: Primary | ICD-10-CM

## 2025-02-24 RX ORDER — ONDANSETRON 4 MG/1
4 TABLET, FILM COATED ORAL EVERY 8 HOURS PRN
Qty: 20 TABLET | Refills: 0 | Status: SHIPPED | OUTPATIENT
Start: 2025-02-24 | End: 2025-03-07 | Stop reason: WASHOUT

## 2025-02-24 RX ORDER — OXYCODONE HYDROCHLORIDE 5 MG/1
5 TABLET ORAL EVERY 4 HOURS PRN
Qty: 30 TABLET | Refills: 0 | Status: SHIPPED | OUTPATIENT
Start: 2025-02-24 | End: 2025-03-07 | Stop reason: WASHOUT

## 2025-02-24 RX ORDER — ASPIRIN 325 MG
325 TABLET, DELAYED RELEASE (ENTERIC COATED) ORAL DAILY
Qty: 14 TABLET | Refills: 0 | Status: SHIPPED | OUTPATIENT
Start: 2025-02-24 | End: 2025-03-10

## 2025-02-24 RX ORDER — DOCUSATE SODIUM 100 MG/1
100 CAPSULE, LIQUID FILLED ORAL 2 TIMES DAILY PRN
Qty: 14 CAPSULE | Refills: 0 | Status: SHIPPED | OUTPATIENT
Start: 2025-02-24 | End: 2025-03-03

## 2025-02-28 ENCOUNTER — DOCUMENTATION (OUTPATIENT)
Dept: ORTHOPEDICS | Facility: HOSPITAL | Age: 55
End: 2025-02-28
Payer: COMMERCIAL

## 2025-03-01 NOTE — PROGRESS NOTES
Date of Surgery: 2/24/2025 ([facility])    DESCRIPTION OF PROCEDURE: The patient presented to the operative facility for the above-noted procedures. The patient was met in the preoperative holding area, where the operative site was marked, consents were reviewed and signed, and all preoperative questions were answered. A peripheral nerve block was performed by the Anesthesia Team. The patient was taken to the operating room and placed under general anesthesia. Care was taken to ensure all bony prominences were well padded. The patient was then positioned in a beach chair position. The left shoulder was prepped and draped in usual sterile fashion. A preoperative time-out was performed to confirm correct patient, correct operative site, to ensure all necessary implants and equipment were in the room, as well as to address any anesthesia or nursing concerns. Preoperative antibiotics were administered within 30 minutes of incision time.     EXAMINATION UNDER ANESTHESIA: The patient displayed full passive range of motion. Forward flexion to 170, abduction to 170, external rotation to 60. The glenohumeral joint was stable to both anterior and posterior translation.    OPERATIVE FINDINGS: There was extensive synovitis in the subcoracoid recess and the recess superior to the glenoid labrum. There were diffuse degenerative changes of the anterior and posterior labrym. There were no significant cartilaginous changes of the humeral head or glenoid. There were notable adhesions in the rotator interval. The biceps tendon was noted to be significantly tenosynovitic and frayed. The subscapularis tendon was intact, inserting into the lesser tuberosity. The supraspinatus was fully torn off of the greater tuberosity with retraction to the AC joint, this extended into the infraspinatus tendon.    SHOULDER DIAGNOSTIC ARTHROSCOPY, EXTENSIVE DEBRIDEMENT, LYSIS OF ADHESIONS:   A standard posterolateral arthroscopic portal was  made 2 cm medial and inferior to the posterolateral border of the acromion. The arthroscope was introduced into the glenohumeral joint. Under direct visualization, an anterior portal was made in the rotator interval. A diagnostic arthroscopy was performed with the findings as noted below:    There was extensive synovitis in the subcoracoid recess and recess superior to the glenoid labrum. This was debrided using a 4.0 mm shaver and an electrocautery device. There was degenerative tearing of the anterosuperior and posterior labrum, which was also debrided with a 4.0 mm shaver. There were grade 1 changes of the humeral head and glenoid, a gentle chondroplasty was performed with the 4.0 mm shaver. There were notable adhesions in the rotator interval, which were lysed with a combination of a radiofrequency device as well as the shaver. The biceps tendon was noted to be significantly tenosynovitic and frayed, and it was released from its insertion on the superior labrum using the electrocautery device for later open biceps tenodesis. The subcapsularis tendon was intact, inserting into the lesser tuberosity.  The supraspinatus was fully torn off the greater tuberosity with retraction to the AC joint, this extended into the infraspinatus tendon.     ROTATOR CUFF REPAIR OF SUPRASPINATUS AND INFRASPINATUS TENDONS:   Attention was then turned to repair of the rotator cuff tears. There was a full-thickness tear of the supraspinatus and infraspinatus tendons. In order to fully mobilize the tendons soft tissue releases were performed anteriorly, superiorly, and inferiorly, essentially circumferentially around the tendons. This allowed for the tendon to be pulled over to the greater tuberosity. Next, two medial row SwiveLock anchors were placed, one anterior and one posterior at the articular margin. One FiberTape from each medial row anchor with two limbs was passed using a suture passer through the rotator cuff tendon. One limb  of each medial row anchor was subsequently brought to one of two lateral row anchors, one placed anterior and one placed posterior in knotless fashion; therefore, creating a double row rotator cuff repair with a Speedbridge technique.  At this time, there is additional tearing of the posterior aspect of the cuff.  2 nonabsorbable sutures were placed in a luggage tag configuration and placed into a single swivel lock anchor laterally.      OPEN BICEPS TENODESIS:   Attention was then turned to the open biceps tenodesis. A 3 cm incision was made in the axilla. Bovie electrocautery was used to achieve hemostasis. The interval between the pectoralis major and conjoint tendon was developed using sharp dissection. Next, the bicipital groove was identified. The bicipital sheath was opened with Metzenbaum scissors. The biceps tendon was removed from its groove and a biceps FiberTak anchor was drilled and placed and then the tendon was whipstitched with the 2 suture tapes from the anchor. The knots were then tensioned and tied down, tenodesing the biceps into the proximal humerus.    WOUND CLOSURE: All wounds were thoroughly irrigated. The biceps tenodesis deep dermal layer was closed with 2-0 buried Vicryl suture and skin with subcuticular 3-0 Monocryl suture. The arthroscopy portals were closed with buried 3-0 Monocryl suture. All incisions were then closed with surgical glue and sterile dressings were applied.

## 2025-03-04 ENCOUNTER — EVALUATION (OUTPATIENT)
Dept: PHYSICAL THERAPY | Facility: CLINIC | Age: 55
End: 2025-03-04
Payer: COMMERCIAL

## 2025-03-04 DIAGNOSIS — M75.102 TEAR OF LEFT ROTATOR CUFF, UNSPECIFIED TEAR EXTENT, UNSPECIFIED WHETHER TRAUMATIC: Primary | ICD-10-CM

## 2025-03-04 DIAGNOSIS — Z98.890 STATUS POST SURGERY: ICD-10-CM

## 2025-03-04 PROCEDURE — 97110 THERAPEUTIC EXERCISES: CPT | Mod: GP | Performed by: PHYSICAL THERAPIST

## 2025-03-04 PROCEDURE — 97162 PT EVAL MOD COMPLEX 30 MIN: CPT | Mod: GP | Performed by: PHYSICAL THERAPIST

## 2025-03-04 ASSESSMENT — PAIN - FUNCTIONAL ASSESSMENT: PAIN_FUNCTIONAL_ASSESSMENT: 0-10

## 2025-03-04 ASSESSMENT — PAIN SCALES - GENERAL: PAINLEVEL_OUTOF10: 9

## 2025-03-04 NOTE — PROGRESS NOTES
Physical Therapy Evaluation and Treatment        Patient Name: Alejandra Rascon  MRN: [id number]  Today's Date: 3/4/2025    Time Entry: Start time: 242            End time: 325     Reason for Visit  Referred Dx:Tear of left rotator cuff, unspecified tear extent, unspecified whether traumatic [M75.102], Status post surgery [Z98.890]   Referred By: Heriberto Galindo MD MPH    Insurance Information  Visit #: 1  Approved Visits: 60 VISITS  Auth required:  NO AUTH  Insurance:[facility]   : verified with patient  Date of Surgery: 2025     Hand dominance: Right     Subjective    Chief complaint: The patient c/o pain in L shoulder, difficulty with shoes, difficulty dressing, difficulty washing and combing hair, pain in the neck, difficulty lifting and carrying things with L arm, pain and trouble sleeping at night, and difficulty driving  Onset: 2025  Elem: The patient reports that she was getting out of the car when she slipped on ice and fell on her left side, injuring her left side head, neck, and shoulder 2025. She started immediate pain in her left arm. The patient had a pre-scheduled oncology appointment, during which she mentioned her pain to the doctor. The doctor ordered an x-ray; however, the results were concerning, and was referred to an orthopedic specialist. The orthopedic doctor ordered an MRI to confirm a suspected rotator cuff tear. Following the MRI, she was diagnosed with a rotator cuff tear and underwent rotator cuff repair surgery on 2025 before 10 days. After the surgery, she was given a sling to wear for four weeks and was referred to physical therapy for rehabilitation.  PMHx: Cervical radiculopathy, Vertigo, Carpal tunnel syndrome B/L, Lumbar radiculitis, Lumbar spondylosis, Sprain lig lumbosacral joint, Cold intolerance, Anxiety, Primary hypertension, Degenerative siscs L5-S1  Barrier to the treatment : N/A  PLOF: independent without restrictions  Medications: see  chart for full medication list   Medical Screening: Patient denies recent infection/illness, headaches, chest pain, SOB,    Functional Limitations:The patient presents with pain in L shoulder, difficulty with shoes, difficulty dressing, difficulty washing and combing hair, pain in the neck, difficulty lifting and carrying things with L arm, pain and trouble sleeping at night, and difficulty driving  Patient goals for PT: The patient states that she wants to be pain-free and wants to move her arm as she was before the surgery to do all functions using both arms.   Precautions:  Precautions  STEADI Fall Risk Score (The score of 4 or more indicates an increased risk of falling): 1 (Slipped and fell on ice , Jan 2025)  Precautions Comment: Low fall risk  RCT and Biceps tenodesis related precautions  Pain:  Pain Assessment  Pain Assessment: 0-10  0-10 (Numeric) Pain Score: 9  Pain Location: Shoulder  Pain Orientation: Left  Pain Descriptors: Sore, Spasm, Tender, Tightness, Aching, Numbness, Other (Comment) (Patient states that painin the shoulder make neck and head hurts)  Pain Frequency: Constant/continuous  Pain Interventions: Cold pack, Medication (See MAR)  At Best: 5/10  At Worst: 9/10  Aggravating Factors: Movement , using L arm doing any movement  Relieving factors: pain meds, Ice , Rest  Home Living:  Home Living  Home Living Comment: The patient states that she lives in single story house, has difficulty doing all house chores and laundry activities,  and daughter helps with that.  Prior Level of Function:  Prior Function Per Pt/Caregiver Report  Level of [place]: Independent with ADLs and functional transfers  Vocational: Retired (On Disability)  Hand Dominance: Right    Objective   Observation:  Posture: Patient presents with L shoulder supported in a sling, forward head rounded and depressed L shoulder.  Palpation:   TTP: around the scar and incision L shoulder and ant arm pit around scar  Scar:   healing adhered abd tender arthroscopic scar in the front and the back and ant axillary border  Spasm/Tightness: L UT ,pect , biceps  Examination:  Cervical ROM:               Flexion: wfl (painfree)              Extension: wfl (painfree)               Rotation: L:  40  (painful), R: 25 (painful)               SB:L: 35 , R: 15 (painful)  Shoulder ROM:              Flexion: L: AAROM: 40*(painful) R: wfl (painfree)               Abduction: L: AAROM: 20*(painful)  R: wfl (painfree)               Scaption: L: AAROM: 30(painful)  R: wfl (painfree)               ER(90/90): L: Differed  R: wfl (painfree)               IR(90/90): L:  Deferred R: wfl (painfree)               Horizontal Abduction: L: Differed R : wfl (painfree)               Horizontal Adduction: Deferred  Note: Scapular elevation present during all shoulder elevation motion on L side.  Endfeel: Empty Endfeel all ROM of L shoulder  MMT: ( MMT is measured in avail ROM / isometric strength /affected by pain)  Flexion: L: 3-/5 L:4+/5              Abduction: L :3-/5 L:4+/5               Extension: L:3-/5 R:4+/5              ER(90/90): L:3-/5 R:4+/5              IR(90/90): L:3-/5 R:4+/5              Lower trap: R:Deferred today d/t pain and avail ROM L:4/5              Middle trap: R:Deferred today d/t pain and avail ROM  L:4/5              Rhomboid: R:3/5 L:4/5  Special Tests (Shoulder):  Differed   Neuro Exam:              Dermatome exam: Normal               Myotome exam: differed               Reflexes: 2+ on L side  Joint Play Assessment: Differed   Repeated Movements:  Differed     Outcome Measures:  Quick DASH :39/50, 10 quest, 72.50% mod - sever functional limitations     Assessment:  PT Assessment  PT Assessment Results: Decreased strength, Decreased range of motion, Pain, Orthopedic restrictions  Rehab Prognosis: Good  Evaluation/Treatment Tolerance: Patient tolerated treatment well  Strengths: Ability to acquire knowledge    A 55 y/o patient presents  with 10 days s/p L shoulder arthroscopy, rotator cuff repair and tendon repair with graft with chief complaint of L shoulder sx related pain that has been present after sx. Patient notes that using his L arm tends to aggravate her sxs. Patient presents with signs and consistent with s/ p L RTC repair. Patient demonstrates decreased shoulder ROM, decreased shoulder/scapular strength, impaired scapulohumeral rhythm, and increased shoulder pain, which limits his/her current functional ability. Patient would likely benefit from skilled outpatient PT in order to address the above deficits and return to PLOF.    Plan:  OP PT Plan  Treatment/Interventions: Aquatic therapy, Cryotherapy, Dry needling, Education/ Instruction, Taping techniques, Therapeutic activities, Therapeutic exercises, Other (comment), Manual therapy, Self care/ home management  PT Plan: Skilled PT  PT Frequency: 2 times per week  Duration: 10 -12 wk  Onset Date: 03/24/25  Certification Period Start Date: 03/04/25  Certification Period End Date: 06/02/25  Number of Treatments Authorized: 60 visit  Rehab Potential: Good  Plan of Care Agreement: Patient   EDUCATION:  Outpatient Education  Individual(s) Educated: Patient  Education Provided: Body Mechanics, POC, Post-Op Precautions, Posture, Home Exercise Program, Other  Risk and Benefits Discussed with Patient/Caregiver/Other: yes  Patient/Caregiver Demonstrated Understanding: yes  Plan of Care Discussed and Agreed Upon: yes  Patient Response to Education: Patient/Caregiver Verbalized Understanding of Information  Goals:  STG: (0-4 wks)  Primary goal is to protect the tendon repair and promote tendon-to-bone healing.   6-week period of immobilization with a sling, and delayed start of PROM is recommended for large-sized tears.   Reduce inflammation and pain. Cryotherapy and transcutaneous electrical neuromuscular stimulation help to control post-operative pain.   LTG: (10 -12 wks)  - Patient will improve  in pain level by 2 point on NPRS to improve functional performance and daily activities such as lifting, carrying, overhead reaching activities using L arm and shoulder.  - Patient will demonstrate improvement in QuickDASH score by at least 12 points in order to meet MCID and to improve functional performance and daily activities such as lifting, carrying, overhead reaching activities using L arm and shoulder.   - Patient will demonstrate full shoulder AROM without pain to improve functional performance and daily activities such as lifting, carrying, overhead reaching activities using L arm and shoulder.   - Patient will demonstrate at least 4+/5 strength shoulder in all planes without pain to improve functional performance and daily activities such as lifting, carrying, overhead reaching activities using L arm and shoulder.   - Patient will be able to return to perform daily activities and house chores without pain or compensation to improve functional performance and daily activities such as lifting, carrying, overhead reaching activities using L arm and shoulder.   - Patient will demonstrate good scapulohumeral rhythm with overhead shoulder motion to improve functional performance and daily activities such as lifting, carrying, overhead reaching activities using L arm and shoulder.   - Patient will demonstrate independence with HEP to improve functional performance and daily activities such as lifting, carrying, overhead reaching activities using L arm and shoulder.   Treatment today:   Initail Eval: ( 65927: 30 min  : 1 units), There Ex: ( 67419 :  12 min  :1 units)  1. Initial evaluation   2. Pt ed on diagnosis, prognosis, goals of PT, expectations   3. Ther Ex  Scapular retractions - 1 x 10 reps , Hold 3s  R UT stretch - 1 x 3 reps, hold 20s  Codman shoulder flex - ext - 1 x 10 reps , Hold 3s  Codman shoulder abd - add- 1 x 10 reps , Hold 3s   Codman circles- 1 x 10 reps , Hold 3s  - The patient reports  feeling a little better in her neck pain post-UT stretch. The patient is advised to follow the precautions and use the sling as advised. HEP reviewed with the patient, and the patient verbalized understanding.     HEP:   SCAPULAR RETRACTION   - With your arm raised up  with elbow bent, draw your shoulder blade back and down.   - Repeat 10 Times, Complete 2 Sets, Hold 5 Seconds, Perform 2 Times a Day   Upper Trap Stretch   1) Sidebend your head (ear to shoulder) to one side.    2)Rotate your neck to the OPPOSITE side and then bring your head forward pulling slightly with your arm as pictured. Make sure not to pull hard at first.   3) Play with the angles to get the best stretch   MODIFICATION: You can add more stretch by placing your palm face down underneath your bottom or grabbing the bottom of a chair.   - Repeat 3 Times, Complete 1 Set, Hold 30 Seconds Perform 2 Times a Day  PENDULUM LATERAL - CODMAN   - Shift your body weight side to side to allow your injured arm to swing side to side freely. Your injured arm should be fully relaxed.   - Duration 10 Seconds Complete 2 Sets Perform 2 Times a Day   PENDULUM FORWARD BACK - CODMAN   - Shift your body weight forward then back to allow your injured arm to swing forward and back freely. Your injured arm should be fully relaxed. -Duration 10 Seconds Complete 2 Sets Perform 2 Times a Day   PENDULUM CIRCLES - CODMAN   - Shift your body weight in circles to allow your injured arm to swing in circles freely. Your injured arm should be fully relaxed.   - Duration 10 Seconds Complete 2 Sets Perform 2 Times a Day

## 2025-03-05 ENCOUNTER — TELEPHONE (OUTPATIENT)
Dept: PRIMARY CARE | Facility: CLINIC | Age: 55
End: 2025-03-05
Payer: COMMERCIAL

## 2025-03-05 DIAGNOSIS — Z12.11 SCREEN FOR COLON CANCER: Primary | ICD-10-CM

## 2025-03-07 ENCOUNTER — OFFICE VISIT (OUTPATIENT)
Dept: ORTHOPEDIC SURGERY | Facility: CLINIC | Age: 55
End: 2025-03-07
Payer: COMMERCIAL

## 2025-03-07 ENCOUNTER — TREATMENT (OUTPATIENT)
Dept: PHYSICAL THERAPY | Facility: CLINIC | Age: 55
End: 2025-03-07
Payer: COMMERCIAL

## 2025-03-07 DIAGNOSIS — Z98.890 STATUS POST SURGERY: ICD-10-CM

## 2025-03-07 DIAGNOSIS — Z98.890 STATUS POST ROTATOR CUFF REPAIR: Primary | ICD-10-CM

## 2025-03-07 DIAGNOSIS — M75.102 TEAR OF LEFT ROTATOR CUFF, UNSPECIFIED TEAR EXTENT, UNSPECIFIED WHETHER TRAUMATIC: Primary | ICD-10-CM

## 2025-03-07 PROCEDURE — 97140 MANUAL THERAPY 1/> REGIONS: CPT | Mod: GP | Performed by: PHYSICAL THERAPIST

## 2025-03-07 PROCEDURE — 97110 THERAPEUTIC EXERCISES: CPT | Mod: GP | Performed by: PHYSICAL THERAPIST

## 2025-03-07 PROCEDURE — 99211 OFF/OP EST MAY X REQ PHY/QHP: CPT | Performed by: STUDENT IN AN ORGANIZED HEALTH CARE EDUCATION/TRAINING PROGRAM

## 2025-03-07 NOTE — PROGRESS NOTES
Physical Therapy Treatment      Patient Name: Alejandra Rascon  MRN: [id number]  Today's Date: 3/7/2025    Time Entry: Start time: 148            End time: 229     Reason for Visit  Referred Dx:Tear of left rotator cuff, unspecified tear extent, unspecified whether traumatic [M75.102], Status post surgery [Z98.890]   Referred By: Heriberto Galindo MD MPH    Insurance Information  Visit #: 2  Approved Visits: 60 VISITS  Auth required:  NO AUTH  Insurance:[facility]   : verified with patient  Date of Surgery: 2025   Hand dominance: Right      Subjective   The patient states that she had a follow-up appointment with her surgeon today. The doctor advised her not to put any weight on L's hand and said it would be a little painful in the shoulder for the first four weeks. When she asked about the pain, the patient. The patient states that compliance with the HEP and reports that swinging (codeman's) is a little difficult and painful.   Precautions  STEADI Fall Risk Score (The score of 4 or more indicates an increased risk of falling): 0  Precautions Comment: Low fall risk  Pain  At present: 7/10 (Tylenol)  At Best: 5/10  At Worst: 9/10  Aggravating Factors: Movement , using L arm doing any movement  Relieving factors: pain meds, Ice , Rest  Objective   Today's finding:   Today's finding:   Posture: Rt shoulder forward protraction and slightly depressed noted with slightly increased int rot of L arm, when arms are by the side.  Impaired ST flexibility: tightness/ spasm ST L shoulder, LUT, deltoid, RTC ms, tight T konstantin, tightness biceps  Impaired L Shoulder ROM: (limited by pain) PROM ( painfree/ as tolerated) : Flex: 0 - 60 *, Abd: 0- 30*, Scap: 0 -40   Cervical AROM: Rotation: R:  40  (painful), L: 25 (painful) , SB:R: 35 , L: 15 (painful)  Note: Scapular elevation present during all shoulder elevation motion on R side.  Endfeel: Empty Endfeel all ROM of R shoulder  Impaired ms strength: R Shoulder ms:  3-/5,  triceps: 4- /5, Biceps: 4-/5, Lower tr  Treatment Performed:   Ther Ex  Scapular retractions - 1 x 10 reps , Hold 3s  R UT stretch - 1 x 3 reps, hold 20s  Codman shoulder flex - ext - 1 x 10 reps , Hold 3s  Codman shoulder abd - add- 1 x 10 reps , Hold 3s   Codman circles- 1 x 10 reps , Hold 3s  Manual Therapy  PROM  shoulder flexion (painfree range) x 10  PROM shoulder abduction (painfree range) x 10  R UT stretch - 1 x 3 reps, Hold 20s  Charges today:  There Ex: 25 min : 2 units  Manual Therapy: 15 min : 1 unit    Assessment:  The patient presents with 2 weeks s/p RTC repair, with cont pain at rest and with movement, limited shoulder mobility, and weakness, RTC ms and scapular ms weakness, impaired scapular stability, R UT and biceps tightness, and triceps and scapular weakness. Treatment began with gentle PROM as tolerated in the pain-free range. Improved L shoulder mob was noted with flexion Flexion 70* and abd 35*, with no change in pain. Codman shoulder flex/ext, abd/ ext, and cw/ccw circles improved the exs by introducing body movement to avoid an increase in pain. The patient reports feeling easier and able to do it with no pain. HEP reviewed with the patient, and the patient verbalized understanding.   Plan:  Progress to PT as POC to achieve established goals.     HEP:   SCAPULAR RETRACTION   - With your arm raised up  with elbow bent, draw your shoulder blade back and down.   - Repeat 10 Times, Complete 2 Sets, Hold 5 Seconds, Perform 2 Times a Day   Upper Trap Stretch   1) Sidebend your head (ear to shoulder) to one side.    2)Rotate your neck to the OPPOSITE side and then bring your head forward pulling slightly with your arm as pictured. Make sure not to pull hard at first.   3) Play with the angles to get the best stretch   MODIFICATION: You can add more stretch by placing your palm face down underneath your bottom or grabbing the bottom of a chair.   - Repeat 3 Times, Complete 1 Set, Hold 30 Seconds  Perform 2 Times a Day  PENDULUM LATERAL - CODMAN   - Shift your body weight side to side to allow your injured arm to swing side to side freely. Your injured arm should be fully relaxed.   - Duration 10 Seconds Complete 2 Sets Perform 2 Times a Day   PENDULUM FORWARD BACK - CODMAN   - Shift your body weight forward then back to allow your injured arm to swing forward and back freely. Your injured arm should be fully relaxed. -Duration 10 Seconds Complete 2 Sets Perform 2 Times a Day   PENDULUM CIRCLES - CODMAN   - Shift your body weight in circles to allow your injured arm to swing in circles freely. Your injured arm should be fully relaxed.   - Duration 10 Seconds Complete 2 Sets Perform 2 Times a Day

## 2025-03-08 NOTE — PROGRESS NOTES
PRIMARY CARE PHYSICIAN: Sue Moyer MD    ORTHOPAEDIC POSTOPERATIVE VISIT    ASSESSMENT & PLAN    Impression: 54 y.o. female 11 days postop s/p left shoulder arthroscopic rotator cuff repair, subacromial decompression, and biceps tenodesis.  Overall, the patient is doing well.    Plan:   At this time, she will continue utilizing the sling as per instruction and will wean out of the sling at 5 weeks postoperatively.  She will continue physical therapy as per protocol utilizing the physical therapy team.    I reviewed the intraoperative findings with the patient and answered all their questions. I reviewed their postoperative timeline and plan with them. They understand the postoperative precautions and the treatment plan going forward.     Follow-Up: Patient will follow-up in 4-5 weeks.    At the end of the visit, all questions were answered in full. The patient is in agreement with the plan and recommendations. They will call the office with any questions/concerns.      Note dictated with [employer] software. Completed without full typed error editing and sent to avoid delay.      SUBJECTIVE  CHIEF COMPLAINT:   Chief Complaint   Patient presents with    Left Shoulder - Post-op     Left shoulder post op visit        HPI: Alejandra Rascon is a 54 y.o. patient. Alejandra Rascon is now postop status post left shoulder arthroscopic rotator cuff repair, subacromial decompression, and biceps tenodesis.  Overall, the patient is recovering well.  She does have daily pain that is keeping her up at night and is occasionally taking her narcotic medication.  She has been working physical therapy on range of motion as per instruction.  She has remained in her sling as per instruction.  No reported issues with her surgical incisions.  No reports of distal numbness or tingling.    REVIEW OF SYSTEMS  Constitutional: See HPI for pain assessment, No significant weight loss, recent trauma  Cardiovascular: No  chest pain, shortness of breath  Respiratory: No difficulty breathing, cough  Gastrointestinal: No nausea, vomiting, diarrhea, constipation  Musculoskeletal: Noted in HPI, positive for pain, restricted motion, stiffness  Integumentary: No rashes, easy bruising, redness   Neurological: no numbness or tingling in extremities, no gait disturbances   Psychiatric: No mood changes, memory changes, social issues  Heme/Lymph: no excessive swelling, easy bruising, excessive bleeding  ENT: No hearing changes  Eyes: No vision changes    CURRENT MEDICATIONS:   Current Outpatient Medications   Medication Sig Dispense Refill    amLODIPine (Norvasc) 5 mg tablet Take 1 tablet (5 mg) by mouth once daily. 90 tablet 3    aspirin 325 mg EC tablet Take 1 tablet (325 mg) by mouth once daily for 14 days. 14 tablet 0    CeleBREX 100 mg capsule Take by mouth.      gabapentin (Neurontin) 300 mg capsule TAKE ONE CAPSULE BY MOUTH THREE TIMES A DAY 90 capsule 0    meclizine (Antivert) 25 mg tablet Take by mouth.      multivitamin tablet Take by mouth.      pantoprazole (ProtoNix) 40 mg EC tablet Take 1 tablet (40 mg) by mouth once daily. Do not crush, chew, or split. 30 tablet 5    triamcinolone (Kenalog) 0.025 % ointment Triamcinolone Acetonide 0.025 % External Ointment  Refills: 0  Start: 7-May-2020      Zepbound 12.5 mg/0.5 mL injection INJECT 12.5 MG SUBCUTANEOUSLY ONCE A WEEK      Zepbound 15 mg/0.5 mL injection INJECT 15 MG SUBCUTANEOUSLY EVERY WEEK. PLEASE DO NOT FILL AFTER 30 DAYS IF NOT PICKED UP.       No current facility-administered medications for this visit.        OBJECTIVE    PHYSICAL EXAM      8/22/2024    11:42 AM 8/22/2024    12:00 PM 8/22/2024    12:15 PM 10/22/2024    12:08 PM 10/23/2024     1:01 PM 1/21/2025    12:30 PM 1/31/2025     9:14 AM   Vitals   Systolic 102 108 123 133 112 121    Diastolic 67 70 81 89 70 85    BP Location Left arm Left arm Left arm  Right arm     Heart Rate 96 76 71 91 81 118    Temp 36.2 °C (97.2  "°F) 36.1 °C (97 °F) 36.1 °C (97 °F) 36 °C (96.8 °F) 36.4 °C (97.5 °F) 36 °C (96.8 °F)    Resp 14 14 18 16  18    Height     1.676 m (5' 6\")  1.676 m (5' 6\")   Weight (lb)    181.44 181.6 167.99 166   BMI    29.28 kg/m2 29.31 kg/m2 27.11 kg/m2 26.79 kg/m2   BSA (m2)    1.96 m2 1.96 m2 1.88 m2 1.87 m2   Visit Report    Report Report Report Report      There is no height or weight on file to calculate BMI.    General: Well-appearing, no acute distress    Skin intact bilateral upper and lower extremities  No erythema  No warmth    Detailed examination of the left shoulder demonstrates:  Surgical incision(s) healing well.  No erythema or warmth  No drainage  No ecchymosis  Resolving swelling, minimal  Biceps contour normal  Shoulder range of motion deferred  Upper extremity motor grossly intact  C5-T1 sensation intact bilaterally  2+ radial pulses bilaterally  Warm and well-perfused, brisk capillary refill    IMAGING:   No new imaging    Bret Galindo MD, MPH  Attending Surgeon    Sports Medicine Orthopaedic Surgery  [facility] Sports [facility] [employer]   "

## 2025-03-09 PROBLEM — Z98.890 STATUS POST SURGERY: Status: ACTIVE | Noted: 2025-03-09

## 2025-03-09 PROBLEM — M75.102 TEAR OF LEFT ROTATOR CUFF: Status: ACTIVE | Noted: 2025-03-09

## 2025-03-09 NOTE — PROGRESS NOTES
Physical Therapy Treatment      Patient Name: Alejandra Rascon  MRN: [id number]  Today's Date: 3/9/2025    Time Entry: Start time: 148            End time: 229     Reason for Visit  Referred Dx:Tear of left rotator cuff, unspecified tear extent, unspecified whether traumatic [M75.102], Status post surgery [Z98.890]   Referred By: Heriberto Galindo MD MPH    Insurance Information  Visit #: 2  Approved Visits: 60 VISITS  Auth required:  NO AUTH  Insurance:[facility]   : verified with patient  Date of Surgery: 2025   Hand dominance: Right    Subjective   The patient states that she had a follow-up appointment with her surgeon today. The doctor advised her not to put any weight on L hand and said it would be a little painful in the shoulder for the first four weeks. When she asked about the pain, the patient. The patient states that compliance with the HEP and reports that swinging (codeman's) is a little difficult and painful.   Precautions  STEADI Fall Risk Score (The score of 4 or more indicates an increased risk of falling): 0  Precautions Comment: Low fall risk. S/p RCT and Biceps tendonesis related precautions  Pain  At present: 7/10 (Tylenol)  At Best: 5/10  At Worst: 9/10  Aggravating Factors: Movement , using L arm doing any movement  Relieving factors: pain meds, Ice , Rest  Objective   Today's finding:   Today's finding:   Posture: Rt shoulder forward protraction and slightly depressed noted with slightly increased int rot of L arm, when arms are by the side.  Impaired ST flexibility: tightness/ spasm ST L shoulder, LUT, deltoid, RTC ms, tight T konstantin, tightness biceps  Impaired L Shoulder ROM: (limited by pain) PROM ( painfree/ as tolerated) : Flex: 0 - 60 *, Abd: 0- 30*, Scap: 0 -40   Cervical AROM: Rotation: R:  40  (painful), L: 25 (painful) , SB:R: 35 , L: 15 (painful)  Note: Scapular elevation present during all shoulder elevation motion on R side.  Endfeel: Empty Endfeel all ROM of R  shoulder  Impaired ms strength: R Shoulder ms:  3-/5, triceps: 4- /5, Biceps: 4-/5, Lower tr  Treatment Performed:   Ther Ex  Scapular retractions - 1 x 10 reps , Hold 3s  R UT stretch - 1 x 3 reps, hold 20s  Codman shoulder flex - ext - 1 x 10 reps , Hold 3s  Codman shoulder abd - add- 1 x 10 reps , Hold 3s   Codman circles- 1 x 10 reps , Hold 3s  Manual Therapy  PROM  shoulder flexion (painfree range) x 10  PROM shoulder abduction (painfree range) x 10  R UT stretch - 1 x 3 reps, Hold 20s  Charges today:  There Ex: 25 min : 2 units  Manual Therapy: 15 min : 1 unit    Assessment:  The patient presents with 2 weeks s/p RTC repair, with cont pain at rest and with movement, limited shoulder mobility, and weakness, RTC ms and scapular ms weakness, impaired scapular stability, R UT and biceps tightness, and triceps and scapular weakness. Treatment began with gentle PROM as tolerated in the pain-free range. Improved L shoulder mob was noted with flexion Flexion 70* and abd 35*, with no change in pain. Codman shoulder flex/ext, abd/ ext, and cw/ccw circles improved the exs by introducing body movement to avoid an increase in pain. The patient reports feeling easier and able to do it with no pain. HEP reviewed with the patient, and the patient verbalized understanding.   Plan:  Progress to PT as POC to achieve established goals.     HEP:   SCAPULAR RETRACTION   - With your arm raised up  with elbow bent, draw your shoulder blade back and down.   - Repeat 10 Times, Complete 2 Sets, Hold 5 Seconds, Perform 2 Times a Day   Upper Trap Stretch   1) Sidebend your head (ear to shoulder) to one side.    2)Rotate your neck to the OPPOSITE side and then bring your head forward pulling slightly with your arm as pictured. Make sure not to pull hard at first.   3) Play with the angles to get the best stretch   MODIFICATION: You can add more stretch by placing your palm face down underneath your bottom or grabbing the bottom of a  chair.   - Repeat 3 Times, Complete 1 Set, Hold 30 Seconds Perform 2 Times a Day  PENDULUM LATERAL - CODMAN   - Shift your body weight side to side to allow your injured arm to swing side to side freely. Your injured arm should be fully relaxed.   - Duration 10 Seconds Complete 2 Sets Perform 2 Times a Day   PENDULUM FORWARD BACK - CODMAN   - Shift your body weight forward then back to allow your injured arm to swing forward and back freely. Your injured arm should be fully relaxed. -Duration 10 Seconds Complete 2 Sets Perform 2 Times a Day   PENDULUM CIRCLES - CODMAN   - Shift your body weight in circles to allow your injured arm to swing in circles freely. Your injured arm should be fully relaxed.   - Duration 10 Seconds Complete 2 Sets Perform 2 Times a Day

## 2025-03-14 ENCOUNTER — APPOINTMENT (OUTPATIENT)
Dept: PHYSICAL THERAPY | Facility: CLINIC | Age: 55
End: 2025-03-14
Payer: COMMERCIAL

## 2025-03-18 ENCOUNTER — TREATMENT (OUTPATIENT)
Dept: PHYSICAL THERAPY | Facility: CLINIC | Age: 55
End: 2025-03-18
Payer: COMMERCIAL

## 2025-03-18 DIAGNOSIS — Z98.890 STATUS POST SURGERY: ICD-10-CM

## 2025-03-18 DIAGNOSIS — M75.102 TEAR OF LEFT ROTATOR CUFF, UNSPECIFIED TEAR EXTENT, UNSPECIFIED WHETHER TRAUMATIC: ICD-10-CM

## 2025-03-18 PROCEDURE — 97140 MANUAL THERAPY 1/> REGIONS: CPT | Mod: GP | Performed by: PHYSICAL THERAPIST

## 2025-03-18 PROCEDURE — 97110 THERAPEUTIC EXERCISES: CPT | Mod: GP | Performed by: PHYSICAL THERAPIST

## 2025-03-18 NOTE — PROGRESS NOTES
Physical Therapy Treatment      Patient Name: Alejandra Rascon  MRN: [id number]  Today's Date: 3/18/2025    Time Entry: Start time: 240            End time: 323     Reason for Visit  Referred Dx:Tear of left rotator cuff, unspecified tear extent, unspecified whether traumatic [M75.102], Status post surgery [Z98.890]   Referred By: Heriberto Galindo MD MPH    Insurance Information  Visit #: 3  Approved Visits: 60 VISITS  Auth required:  NO AUTH  Insurance:[facility]   : verified with patient  Date of Surgery: 2025   Hand dominance: Right    Subjective   The patient reports the compliance with the HEP with no change in pain except the one exs (ccw codman's). The patient states she ont to have pain a night and difficulty sleeping because of that.  Precautions  STEADI Fall Risk Score (The score of 4 or more indicates an increased risk of falling): 0  Precautions Comment: Low fall risk. S/p RCT and Biceps tendonesis related precautions  Pain  At present: 5/10 (Tylenol)  At Best: 5/10  At Worst: 9/10  Aggravating Factors: Movement , using L arm doing any movement  Relieving factors: pain meds, Ice , Rest  Objective   Today's finding:   Today's finding:   Posture: Rt shoulder forward protraction and slightly depressed noted with slightly increased int rot of L arm, when arms are by the side.  Impaired ST flexibility: tightness/ spasm ST L shoulder, LUT, deltoid, RTC ms, tight T konstantin, tightness biceps  Impaired L Shoulder ROM: (limited by pain) PROM ( painfree/ as tolerated) : Flex: 0 - 60 *, Abd: 0- 30*, Scap: 0 -40   Cervical AROM: Rotation: R:  40  (painful), L: 25 (painful) , SB:R: 35 , L: 15 (painful)  Note: Scapular elevation present during all shoulder elevation motion on R side.  Endfeel: Empty Endfeel all ROM of R shoulder  Impaired ms strength: R Shoulder ms:  3-/5, triceps: 4- /5, Biceps: 4-/5, Lower tr  Treatment Performed:   Ther Ex  Scapular retractions - 1 x 10 reps , Hold 3s  R UT stretch - 1 x 3  reps, hold 20s  Table slide forward x 10  Codman shoulder flex - ext - 1 x 10 reps , Hold 3s  Codman shoulder abd - add- 1 x 10 reps , Hold 3s   Codman circles- 1 x 10 reps , Hold 3s  Manual Therapy  PROM  shoulder flexion (painfree range) x 10  PROM shoulder abduction (painfree range) x 10  R UT stretch - 1 x 3 reps, Hold 20s  Charges today:  There Ex: 25 min : 2 units  Manual Therapy: 15 min : 1 unit    Assessment:  The patient presents with 3 wks s/p RTC repair with cont pain at rest and with movement, limited shoulder mobility, and weakness, RTC ms and scapular ms weakness, impaired scapular stability, R UT and biceps tightness, and triceps and scapular weakness. Treatment began with passive shoulder flexion and abduction as tolerated by patient in pain free range. Improved shoulder flexion to 90* and shoulder abd to 50* noted post ROM exs. Cont with codman's oscillation in standing with leaning against the wall, improved movt noted with no pain felt as she felt at home before coming today, improved movt and initiation of the osccialtion noted from shoulder with less body movt. Follow up PT to progress shoulder mob per RTC protocol and POC as tolerated by the patinet. HEP reviewed with the patient.    Plan:  Progress to PT as POC to achieve established goals.     HEP:   SCAPULAR RETRACTION   - With your arm raised up  with elbow bent, draw your shoulder blade back and down.   - Repeat 10 Times, Complete 2 Sets, Hold 5 Seconds, Perform 2 Times a Day   Upper Trap Stretch   1) Sidebend your head (ear to shoulder) to one side.    2)Rotate your neck to the OPPOSITE side and then bring your head forward pulling slightly with your arm as pictured. Make sure not to pull hard at first.   3) Play with the angles to get the best stretch   MODIFICATION: You can add more stretch by placing your palm face down underneath your bottom or grabbing the bottom of a chair.   - Repeat 3 Times, Complete 1 Set, Hold 30 Seconds Perform  2 Times a Day  PENDULUM LATERAL - CODMAN   - Shift your body weight side to side to allow your injured arm to swing side to side freely. Your injured arm should be fully relaxed.   - Duration 10 Seconds Complete 2 Sets Perform 2 Times a Day   PENDULUM FORWARD BACK - CODMAN   - Shift your body weight forward then back to allow your injured arm to swing forward and back freely. Your injured arm should be fully relaxed. -Duration 10 Seconds Complete 2 Sets Perform 2 Times a Day   PENDULUM CIRCLES - CODMAN   - Shift your body weight in circles to allow your injured arm to swing in circles freely. Your injured arm should be fully relaxed.   - Duration 10 Seconds Complete 2 Sets Perform 2 Times a Day

## 2025-03-20 DIAGNOSIS — Z12.11 SCREENING FOR COLON CANCER: Primary | ICD-10-CM

## 2025-03-20 RX ORDER — POLYETHYLENE GLYCOL 3350, SODIUM SULFATE ANHYDROUS, SODIUM BICARBONATE, SODIUM CHLORIDE, POTASSIUM CHLORIDE 236; 22.74; 6.74; 5.86; 2.97 G/4L; G/4L; G/4L; G/4L; G/4L
4 POWDER, FOR SOLUTION ORAL ONCE
Qty: 4000 ML | Refills: 0 | Status: SHIPPED | OUTPATIENT
Start: 2025-03-20 | End: 2025-03-20

## 2025-03-20 NOTE — TELEPHONE ENCOUNTER
Pended colonoscopy prep, [facility], for approval.  Instructions sent to patient via kingsky, and notified over the phone to  prescription from pharmacy.

## 2025-03-24 ENCOUNTER — APPOINTMENT (OUTPATIENT)
Dept: PHYSICAL THERAPY | Facility: CLINIC | Age: 55
End: 2025-03-24

## 2025-03-25 ENCOUNTER — APPOINTMENT (OUTPATIENT)
Dept: PHYSICAL THERAPY | Facility: CLINIC | Age: 55
End: 2025-03-25
Payer: COMMERCIAL

## 2025-03-26 DIAGNOSIS — Z12.11 COLON CANCER SCREENING: Primary | ICD-10-CM

## 2025-03-26 RX ORDER — SOD SULF/POT CHLORIDE/MAG SULF 1.479 G
12 TABLET ORAL 2 TIMES DAILY
Qty: 24 TABLET | Refills: 0 | Status: SHIPPED | OUTPATIENT
Start: 2025-03-26

## 2025-03-27 ENCOUNTER — APPOINTMENT (OUTPATIENT)
Dept: PHYSICAL THERAPY | Facility: CLINIC | Age: 55
End: 2025-03-27

## 2025-03-28 ENCOUNTER — TREATMENT (OUTPATIENT)
Dept: PHYSICAL THERAPY | Facility: CLINIC | Age: 55
End: 2025-03-28
Payer: COMMERCIAL

## 2025-03-28 DIAGNOSIS — Z98.890 STATUS POST SURGERY: ICD-10-CM

## 2025-03-28 DIAGNOSIS — M75.102 TEAR OF LEFT ROTATOR CUFF, UNSPECIFIED TEAR EXTENT, UNSPECIFIED WHETHER TRAUMATIC: ICD-10-CM

## 2025-03-28 PROCEDURE — 97110 THERAPEUTIC EXERCISES: CPT | Mod: GP | Performed by: PHYSICAL THERAPIST

## 2025-03-28 PROCEDURE — 97140 MANUAL THERAPY 1/> REGIONS: CPT | Mod: GP | Performed by: PHYSICAL THERAPIST

## 2025-03-28 NOTE — PROGRESS NOTES
Physical Therapy Treatment      Patient Name: Alejandra Rascon  MRN: [id number]  Today's Date: 3/28/2025    Time Entry: Start time: 154            End time: 235     Reason for Visit  Referred Dx:Tear of left rotator cuff, unspecified tear extent, unspecified whether traumatic [M75.102], Status post surgery [Z98.890]   Referred By: Heriberto Galindo MD MPH    Insurance Information  Visit #: 4  Approved Visits: 60 VISITS  Auth required:  NO AUTH  Insurance:[facility]   : verified with patient  Date of Surgery: 2025   Hand dominance: Right    Subjective   The patient states that she is complying with the HEP and that she is feeling better than before, able to move her arm a little better with less pain.  Precautions  STEADI Fall Risk Score (The score of 4 or more indicates an increased risk of falling): 0  Precautions Comment: Low fall risk  Pain  At present: 3/10 (No pain meds)  At Best: 5/10  At Worst: 9/10  Aggravating Factors: Movement , using L arm doing any movement  Relieving factors: pain meds, Ice , Rest  Objective   Today's finding:   Today's finding:   Posture: Rt shoulder forward protraction and slightly depressed noted with slightly increased int rot of L arm, when arms are by the side.  Impaired ST flexibility: tightness/ spasm ST L shoulder, LUT, deltoid, RTC ms, tight T konstantin, tightness biceps  Impaired L Shoulder ROM: (limited by pain) PROM ( painfree/ as tolerated) : Flex: 0 - 70 *, Abd: 0- 30*, Scap: 0 -40   Cervical AROM: Rotation: R:  40  (painful), L: 25 (painful) , SB:R: 35 , L: 15 (painful)  Note: Scapular elevation present during all shoulder elevation motion on R side.  Endfeel: Empty Endfeel all ROM of R shoulder  Impaired ms strength: R Shoulder ms:  3-/5, triceps: 4- /5, Biceps: 4-/5, Lower tr  Treatment Performed:   Ther Ex  Scapular retractions x 10 reps , Hold 3s  Table slide forward x 10  Table slide scaption x 10  Table slide abduction x 10  Isometric shoulder flexion x 10    Isometric shoulder abd x 10  Codman shoulder flex - ext x 10 reps , Hold 3s  Codman shoulder abd - add x 10 reps , Hold 3s   Codman circles- 1 x 10 reps , Hold 3s  Manual Therapy  PROM  shoulder flexion (painfree range) x 10  PROM shoulder abduction (painfree range) x 10  R UT stretch - 1 x 3 reps, Hold 20s  Gr I Scapula mob  Charges today:  There Ex: 25 min : 2 units  Manual Therapy: 16 min : 1 unit    Assessment:  The patient presents with 4 wks s/p RTC repair with tenderness present around the scar and ant jt line L shoulder, decreased shoulder mob, decreased scapular stability, RTC, triceps, and scapular weakness. Treatment began with the gentle scapular mob for pain relief with act asst stretch to improve scapular retraction as tolerated; no change in pain was reported after that. Passive shoulder flexion and abd cont as tolerated, increased over activation scapular elevators and deletion noted, cont cuing req inhibiting elevator, improved shoulder flexion noted to 105* and abduction to 60* post manual PROM with no change in pain. Shoulder mob progressed with table slide-in abd and scaption; soreness was reported in the shoulder after that. Shoulder flexors and abductors were challenged with the isometrics against the wall as tolerated, cont cuing, and min - mod directions required bm corrections. HEP updated and reviewed with the patient, and the patient verbalized the understanding. Follow up PT to progress shoulder mob per RTC protocol and POC as tolerated by the patient. HEP reviewed with the patient.    Plan:  Progress to PT as POC to achieve established goals.     HEP:   Table slide scaption - Repeat 10 Times, Complete 2 Sets, Hold 5 Seconds, Perform 2 Times a Day  Table slide abduction - Repeat 10 Times, Complete 2 Sets, Hold 5 Seconds, Perform 2 Times a Day  Shoulder isometric abduction- Repeat 10 Times, Complete 2 Sets, Hold 5 Seconds, Perform 2 Times a Day  Shoulder isometric flexion- Repeat 10  Times, Complete 2 Sets, Hold 5 Seconds, Perform 2 Times a Day

## 2025-03-31 ENCOUNTER — APPOINTMENT (OUTPATIENT)
Dept: PHYSICAL THERAPY | Facility: CLINIC | Age: 55
End: 2025-03-31

## 2025-04-01 ENCOUNTER — ANESTHESIA EVENT (OUTPATIENT)
Dept: GASTROENTEROLOGY | Facility: EXTERNAL LOCATION | Age: 55
End: 2025-04-01

## 2025-04-02 ENCOUNTER — TELEPHONE (OUTPATIENT)
Dept: GASTROENTEROLOGY | Facility: CLINIC | Age: 55
End: 2025-04-02
Payer: COMMERCIAL

## 2025-04-02 NOTE — TELEPHONE ENCOUNTER
Patient called because her insurance company faxed a prior authorization over so she can get her prep for upcoming colonoscopy in [place] on 04/16/2025 with Dr. Calloway

## 2025-04-03 ENCOUNTER — APPOINTMENT (OUTPATIENT)
Dept: PHYSICAL THERAPY | Facility: CLINIC | Age: 55
End: 2025-04-03

## 2025-04-07 ENCOUNTER — APPOINTMENT (OUTPATIENT)
Dept: PHYSICAL THERAPY | Facility: CLINIC | Age: 55
End: 2025-04-07

## 2025-04-08 ENCOUNTER — APPOINTMENT (OUTPATIENT)
Dept: PHYSICAL THERAPY | Facility: CLINIC | Age: 55
End: 2025-04-08
Payer: COMMERCIAL

## 2025-04-08 DIAGNOSIS — Z12.11 COLON CANCER SCREENING: Primary | ICD-10-CM

## 2025-04-08 RX ORDER — SOD SULF/POT CHLORIDE/MAG SULF 1.479 G
12 TABLET ORAL 2 TIMES DAILY
Qty: 24 TABLET | Refills: 0 | Status: SHIPPED | OUTPATIENT
Start: 2025-04-08

## 2025-04-10 ENCOUNTER — TELEPHONE (OUTPATIENT)
Dept: GASTROENTEROLOGY | Facility: CLINIC | Age: 55
End: 2025-04-10
Payer: COMMERCIAL

## 2025-04-10 ENCOUNTER — APPOINTMENT (OUTPATIENT)
Dept: PHYSICAL THERAPY | Facility: CLINIC | Age: 55
End: 2025-04-10

## 2025-04-14 ENCOUNTER — APPOINTMENT (OUTPATIENT)
Dept: PHYSICAL THERAPY | Facility: CLINIC | Age: 55
End: 2025-04-14

## 2025-04-14 NOTE — TELEPHONE ENCOUNTER
Patient called to reschedule her colonoscopy, scheduled for 4/16/2025. Patient also stated that she needed a different type of bowel prep, because she couldn't stomach the one currently prescribed. Please review. Thank you.

## 2025-04-15 ENCOUNTER — OFFICE VISIT (OUTPATIENT)
Dept: ORTHOPEDIC SURGERY | Facility: CLINIC | Age: 55
End: 2025-04-15
Payer: COMMERCIAL

## 2025-04-15 DIAGNOSIS — Z98.890 STATUS POST ROTATOR CUFF REPAIR: Primary | ICD-10-CM

## 2025-04-15 PROCEDURE — 99024 POSTOP FOLLOW-UP VISIT: CPT | Performed by: STUDENT IN AN ORGANIZED HEALTH CARE EDUCATION/TRAINING PROGRAM

## 2025-04-15 ASSESSMENT — PAIN - FUNCTIONAL ASSESSMENT: PAIN_FUNCTIONAL_ASSESSMENT: 0-10

## 2025-04-15 ASSESSMENT — PAIN SCALES - GENERAL: PAINLEVEL_OUTOF10: 4

## 2025-04-15 ASSESSMENT — PAIN DESCRIPTION - DESCRIPTORS: DESCRIPTORS: ACHING;THROBBING

## 2025-04-15 NOTE — PROGRESS NOTES
PRIMARY CARE PHYSICIAN: Sue Moyer MD    ORTHOPAEDIC POSTOPERATIVE VISIT    ASSESSMENT & PLAN    Impression: 54 y.o. female 7 weeks 1 day postop s/p left shoulder arthroscopic rotator cuff repair, subacromial decompression, and biceps tenodesis, overall recovering well.    Plan:   At this time, she has only attended 6 rounds of physical therapy due to difficulty with scheduling.  Will convert her to [facility] for further management.  She will work on range of motion.  She is successfully weaned out of her sling.    I reviewed the intraoperative findings with the patient and answered all their questions. I reviewed their postoperative timeline and plan with them. They understand the postoperative precautions and the treatment plan going forward.     Follow-Up: Patient will follow-up in 6-8 weeks.    At the end of the visit, all questions were answered in full. The patient is in agreement with the plan and recommendations. They will call the office with any questions/concerns.      Note dictated with [employer] software. Completed without full typed error editing and sent to avoid delay.      SUBJECTIVE  CHIEF COMPLAINT:   Chief Complaint   Patient presents with    Left Shoulder - Post-op, Follow-up     L shoulder 2-24-25 rotator cuff repair        HPI: Alejandra Rascon is a 54 y.o. patient. Alejandra Rascon is now postop status post left shoulder arthroscopic rotator cuff repair, subacromial decompression, and biceps tenodesis.  The patient's pain is controlled and she is no longer on narcotic medication.  Over the last week she has successfully weaned out of her sling.  Her surgical incisions are well-healed.  She has no pain at night and is sleeping okay.  She has been working with physical therapy but has had decreased visits due to the patient showing up late, the provider canceling visits, etc.  She is interested in converting her care to [facility] which is closer to her home.  She denies  any dizziness or tingling.  No new injuries or falls.    REVIEW OF SYSTEMS  Constitutional: See HPI for pain assessment, No significant weight loss, recent trauma  Cardiovascular: No chest pain, shortness of breath  Respiratory: No difficulty breathing, cough  Gastrointestinal: No nausea, vomiting, diarrhea, constipation  Musculoskeletal: Noted in HPI, positive for pain, restricted motion, stiffness  Integumentary: No rashes, easy bruising, redness   Neurological: no numbness or tingling in extremities, no gait disturbances   Psychiatric: No mood changes, memory changes, social issues  Heme/Lymph: no excessive swelling, easy bruising, excessive bleeding  ENT: No hearing changes  Eyes: No vision changes    CURRENT MEDICATIONS:   Current Outpatient Medications   Medication Sig Dispense Refill    amLODIPine (Norvasc) 5 mg tablet Take 1 tablet (5 mg) by mouth once daily. 90 tablet 3    CeleBREX 100 mg capsule Take by mouth.      gabapentin (Neurontin) 300 mg capsule TAKE ONE CAPSULE BY MOUTH THREE TIMES A DAY 90 capsule 0    meclizine (Antivert) 25 mg tablet Take by mouth.      multivitamin tablet Take by mouth.      pantoprazole (ProtoNix) 40 mg EC tablet Take 1 tablet (40 mg) by mouth once daily. Do not crush, chew, or split. 30 tablet 5    sod sulf-pot chloride-mag sulf (Sutab) 1.479-0.188- 0.225 gram tablet tablet Take 12 tablets by mouth 2 times a day. 24 tablet 0    sod sulf-pot chloride-mag sulf (Sutab) 1.479-0.188- 0.225 gram tablet tablet Take 12 tablets by mouth 2 times a day. 24 tablet 0    triamcinolone (Kenalog) 0.025 % ointment Triamcinolone Acetonide 0.025 % External Ointment  Refills: 0  Start: 7-May-2020      Zepbound 12.5 mg/0.5 mL injection INJECT 12.5 MG SUBCUTANEOUSLY ONCE A WEEK      Zepbound 15 mg/0.5 mL injection INJECT 15 MG SUBCUTANEOUSLY EVERY WEEK. PLEASE DO NOT FILL AFTER 30 DAYS IF NOT PICKED UP.       No current facility-administered medications for this visit.        OBJECTIVE    PHYSICAL  "EXAM      8/22/2024    11:42 AM 8/22/2024    12:00 PM 8/22/2024    12:15 PM 10/22/2024    12:08 PM 10/23/2024     1:01 PM 1/21/2025    12:30 PM 1/31/2025     9:14 AM   Vitals   Systolic 102 108 123 133 112 121    Diastolic 67 70 81 89 70 85    BP Location Left arm Left arm Left arm  Right arm     Heart Rate 96 76 71 91 81 118    Temp 36.2 °C (97.2 °F) 36.1 °C (97 °F) 36.1 °C (97 °F) 36 °C (96.8 °F) 36.4 °C (97.5 °F) 36 °C (96.8 °F)    Resp 14 14 18 16  18    Height     1.676 m (5' 6\")  1.676 m (5' 6\")   Weight (lb)    181.44 181.6 167.99 166   BMI    29.28 kg/m2 29.31 kg/m2 27.11 kg/m2 26.79 kg/m2   BSA (m2)    1.96 m2 1.96 m2 1.88 m2 1.87 m2   Visit Report    Report Report Report Report      There is no height or weight on file to calculate BMI.    General: Well-appearing, no acute distress    Skin intact bilateral upper and lower extremities  No erythema  No warmth    Detailed examination of the left shoulder demonstrates:  Surgical incision(s) healing well.  No erythema or warmth  No drainage  No ecchymosis  Biceps contour normal  Shoulder range of motion forward flexion 80, abduction 60, external rotation 20 degrees.  Upper extremity motor grossly intact  C5-T1 sensation intact bilaterally  2+ radial pulses bilaterally  Warm and well-perfused, brisk capillary refill    IMAGING:   No new imaging    Bret Galindo MD, MPH  Attending Surgeon    Sports Medicine Orthopaedic Surgery  [facility] [place]  [facility] [employer]   "

## 2025-04-16 ENCOUNTER — ANESTHESIA (OUTPATIENT)
Dept: GASTROENTEROLOGY | Facility: EXTERNAL LOCATION | Age: 55
End: 2025-04-16

## 2025-04-16 ENCOUNTER — APPOINTMENT (OUTPATIENT)
Dept: GASTROENTEROLOGY | Facility: EXTERNAL LOCATION | Age: 55
End: 2025-04-16
Payer: COMMERCIAL

## 2025-04-17 ENCOUNTER — APPOINTMENT (OUTPATIENT)
Dept: PHYSICAL THERAPY | Facility: CLINIC | Age: 55
End: 2025-04-17

## 2025-04-18 ENCOUNTER — APPOINTMENT (OUTPATIENT)
Dept: ORTHOPEDIC SURGERY | Facility: CLINIC | Age: 55
End: 2025-04-18
Payer: COMMERCIAL

## 2025-04-22 ENCOUNTER — APPOINTMENT (OUTPATIENT)
Dept: HEMATOLOGY/ONCOLOGY | Facility: HOSPITAL | Age: 55
End: 2025-04-22
Payer: COMMERCIAL

## 2025-04-22 DIAGNOSIS — C81.00 NODULAR LYMPHOCYTE PREDOMINANT HODGKIN LYMPHOMA, UNSPECIFIED BODY REGION (MULTI): ICD-10-CM

## 2025-04-25 ENCOUNTER — APPOINTMENT (OUTPATIENT)
Dept: PHYSICAL THERAPY | Facility: CLINIC | Age: 55
End: 2025-04-25
Payer: COMMERCIAL

## 2025-04-29 ENCOUNTER — APPOINTMENT (OUTPATIENT)
Dept: PHYSICAL THERAPY | Facility: CLINIC | Age: 55
End: 2025-04-29
Payer: COMMERCIAL

## 2025-04-30 ENCOUNTER — APPOINTMENT (OUTPATIENT)
Dept: GASTROENTEROLOGY | Facility: EXTERNAL LOCATION | Age: 55
End: 2025-04-30
Payer: COMMERCIAL

## 2025-05-01 ENCOUNTER — OFFICE VISIT (OUTPATIENT)
Dept: HEMATOLOGY/ONCOLOGY | Facility: HOSPITAL | Age: 55
End: 2025-05-01
Payer: COMMERCIAL

## 2025-05-01 ENCOUNTER — LAB (OUTPATIENT)
Dept: LAB | Facility: HOSPITAL | Age: 55
End: 2025-05-01
Payer: COMMERCIAL

## 2025-05-01 VITALS
BODY MASS INDEX: 26.74 KG/M2 | TEMPERATURE: 97.3 F | RESPIRATION RATE: 18 BRPM | HEART RATE: 83 BPM | SYSTOLIC BLOOD PRESSURE: 123 MMHG | WEIGHT: 165.7 LBS | DIASTOLIC BLOOD PRESSURE: 79 MMHG | OXYGEN SATURATION: 100 %

## 2025-05-01 DIAGNOSIS — C81.00 NODULAR LYMPHOCYTE PREDOMINANT HODGKIN LYMPHOMA, UNSPECIFIED BODY REGION (MULTI): ICD-10-CM

## 2025-05-01 DIAGNOSIS — G89.29 CHRONIC LEFT SHOULDER PAIN: ICD-10-CM

## 2025-05-01 DIAGNOSIS — G56.03 BILATERAL CARPAL TUNNEL SYNDROME: Primary | ICD-10-CM

## 2025-05-01 DIAGNOSIS — M25.512 CHRONIC LEFT SHOULDER PAIN: ICD-10-CM

## 2025-05-01 LAB
ALBUMIN SERPL BCP-MCNC: 4.6 G/DL (ref 3.4–5)
ALP SERPL-CCNC: 77 U/L (ref 33–110)
ALT SERPL W P-5'-P-CCNC: 17 U/L (ref 7–45)
ANION GAP SERPL CALC-SCNC: 16 MMOL/L (ref 10–20)
AST SERPL W P-5'-P-CCNC: 19 U/L (ref 9–39)
BASOPHILS # BLD AUTO: 0.01 X10*3/UL (ref 0–0.1)
BASOPHILS NFR BLD AUTO: 0.2 %
BILIRUB SERPL-MCNC: 1 MG/DL (ref 0–1.2)
BUN SERPL-MCNC: 20 MG/DL (ref 6–23)
CALCIUM SERPL-MCNC: 10.1 MG/DL (ref 8.6–10.3)
CHLORIDE SERPL-SCNC: 100 MMOL/L (ref 98–107)
CO2 SERPL-SCNC: 27 MMOL/L (ref 21–32)
CREAT SERPL-MCNC: 0.72 MG/DL (ref 0.5–1.05)
EGFRCR SERPLBLD CKD-EPI 2021: >90 ML/MIN/1.73M*2
EOSINOPHIL # BLD AUTO: 0.06 X10*3/UL (ref 0–0.7)
EOSINOPHIL NFR BLD AUTO: 1.5 %
ERYTHROCYTE [DISTWIDTH] IN BLOOD BY AUTOMATED COUNT: 15.5 % (ref 11.5–14.5)
GLUCOSE SERPL-MCNC: 79 MG/DL (ref 74–99)
HCT VFR BLD AUTO: 35.4 % (ref 36–46)
HGB BLD-MCNC: 11.2 G/DL (ref 12–16)
IGA SERPL-MCNC: 194 MG/DL (ref 70–400)
IGG SERPL-MCNC: 837 MG/DL (ref 700–1600)
IGM SERPL-MCNC: 45 MG/DL (ref 40–230)
IMM GRANULOCYTES # BLD AUTO: 0.01 X10*3/UL (ref 0–0.7)
IMM GRANULOCYTES NFR BLD AUTO: 0.2 % (ref 0–0.9)
LDH SERPL L TO P-CCNC: 178 U/L (ref 84–246)
LYMPHOCYTES # BLD AUTO: 1.06 X10*3/UL (ref 1.2–4.8)
LYMPHOCYTES NFR BLD AUTO: 25.9 %
MCH RBC QN AUTO: 29.4 PG (ref 26–34)
MCHC RBC AUTO-ENTMCNC: 31.6 G/DL (ref 32–36)
MCV RBC AUTO: 93 FL (ref 80–100)
MONOCYTES # BLD AUTO: 0.25 X10*3/UL (ref 0.1–1)
MONOCYTES NFR BLD AUTO: 6.1 %
NEUTROPHILS # BLD AUTO: 2.71 X10*3/UL (ref 1.2–7.7)
NEUTROPHILS NFR BLD AUTO: 66.1 %
NRBC BLD-RTO: 0 /100 WBCS (ref 0–0)
PLATELET # BLD AUTO: 254 X10*3/UL (ref 150–450)
POTASSIUM SERPL-SCNC: 3.6 MMOL/L (ref 3.5–5.3)
PROT SERPL-MCNC: 7.2 G/DL (ref 6.4–8.2)
PROT SERPL-MCNC: 7.4 G/DL (ref 6.4–8.2)
RBC # BLD AUTO: 3.81 X10*6/UL (ref 4–5.2)
SODIUM SERPL-SCNC: 139 MMOL/L (ref 136–145)
WBC # BLD AUTO: 4.1 X10*3/UL (ref 4.4–11.3)

## 2025-05-01 PROCEDURE — 36415 COLL VENOUS BLD VENIPUNCTURE: CPT

## 2025-05-01 PROCEDURE — 99215 OFFICE O/P EST HI 40 MIN: CPT | Performed by: INTERNAL MEDICINE

## 2025-05-01 PROCEDURE — 3074F SYST BP LT 130 MM HG: CPT | Performed by: INTERNAL MEDICINE

## 2025-05-01 PROCEDURE — 84165 PROTEIN E-PHORESIS SERUM: CPT

## 2025-05-01 PROCEDURE — 82784 ASSAY IGA/IGD/IGG/IGM EACH: CPT

## 2025-05-01 PROCEDURE — 83615 LACTATE (LD) (LDH) ENZYME: CPT

## 2025-05-01 PROCEDURE — 80053 COMPREHEN METABOLIC PANEL: CPT

## 2025-05-01 PROCEDURE — 85025 COMPLETE CBC W/AUTO DIFF WBC: CPT

## 2025-05-01 PROCEDURE — 84155 ASSAY OF PROTEIN SERUM: CPT

## 2025-05-01 PROCEDURE — 3078F DIAST BP <80 MM HG: CPT | Performed by: INTERNAL MEDICINE

## 2025-05-01 ASSESSMENT — PAIN SCALES - GENERAL: PAINLEVEL_OUTOF10: 4

## 2025-05-01 NOTE — PROGRESS NOTES
Patient ID: Alejandra Rascon is a 54 y.o. female.    Subjective    7/16/24; Patient with nodular lymphocyte predominant Hodgkin's Lymphoma is here for routine follow up.   S/p 6 cycles R-CHOP and post Rx CT/PET showed CR.   Doing very well. Retired now. No new c/o. Neuropathy minimum.  10/22/24: doing well. No new c/o  1/21/25: fell few days ago on icy road. C/o Left shoulder pain and unable to raise arm. Taking Tylenol  5/1/25:     Review of Systems   All other systems reviewed and are negative.     PMHx: chronic LBP with herniated disc at L5-S1 causing sciatica down her right leg.  PSHX; c-sction     FHx; Mother with breast cancer, father with colon cancer, her maternal aunt with uterine ca and breat ca.     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  There were no vitals taken for this visit.     Physical Exam  Constitutional:       Appearance: Normal appearance.   HENT:      Head: Normocephalic and atraumatic.      Mouth/Throat:      Mouth: Mucous membranes are moist.      Pharynx: Oropharynx is clear.   Eyes:      Extraocular Movements: Extraocular movements intact.      Pupils: Pupils are equal, round, and reactive to light.   Cardiovascular:      Rate and Rhythm: Normal rate and regular rhythm.      Heart sounds: No murmur heard.  Pulmonary:      Effort: Pulmonary effort is normal.      Breath sounds: Normal breath sounds.   Abdominal:      General: Abdomen is flat. Bowel sounds are normal.      Palpations: Abdomen is soft.   Musculoskeletal:      Cervical back: Normal range of motion and neck supple.      Right lower leg: No edema.      Left lower leg: No edema.      Comments: Limited ROM left shoulder 2/2 pain. No focal tenderness on palpation   Skin:     General: Skin is warm and dry.    Neurological:      General: No focal deficit present.      Mental Status: She is alert.   Psychiatric:         Mood and Affect: Mood normal.         Behavior: Behavior normal.         Judgment: Judgment normal.     === 02/23/24 ===    NM PET CT LYMPHOMA STAGING    - Impression -  1. Normal-appearing external iliac and inguinal lymph nodes with mild  hypermetabolic activity which is below blood pool. [place]ville 2.  2. Postsurgical changes of right inguinal lymph node excisional  biopsy.  3. No evidence of extranodal hypermetabolic disease    Performance Status:  Asymptomatic      Assessment/Plan   Assessment:   Patient with newly diagnosed nodular lymphocyte predominant Hodgkin's Lymphoma is here for routine follow up.    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  - skip Vincristine for cycle 5 and C6   - repeat right CTS repair next week.     LBP  - cont current meds    HTN    Fhx heart disease     RTC 3 months.           Cancer Staging   No matching staging information was found for the patient.      Oncology History Overview Note   NLPHL(Nodular Lymphocyte predominant B cell Lymphoma)  Patient presented with chronic back pain and MRI 7/11/23 showed DJD and worsening retroperitoneal LN's. CT/PET on 7/17/23 confirmed FDG avid right inguinal, ileac and retroperitoneal LN's. Excisional right inguinal LN  biopsy 8/14/23 c/w nodular lymphocyte  predominant B-cell Lymphoma(NLPHL) cd20+, cd10, cyclin D1 negative.    Treatment  R-CHOP x 6 cycles: 9/20/23- 1/9/2024(Vincristine was omitted 2/2 neuropathy C5 and C6)       Nodular lymphocyte predominant Hodgkin lymphoma   9/20/2023 - 1/23/2024 Chemotherapy    R-CHOP (Cyclophosphamide / DOXOrubicin / VinCRIStine / PredniSONE) + RiTUXimab, 21 Day Cycles     9/27/2023 Initial Diagnosis    Nodular lymphocyte predominant Hodgkin lymphoma (CMS/HCC)            Erlinda Boyd MD

## 2025-05-01 NOTE — PATIENT INSTRUCTIONS
We will see you in 3 months for a visit in 3 months with Dr. Boyd   Please call with any questions

## 2025-05-07 ENCOUNTER — APPOINTMENT (OUTPATIENT)
Dept: GASTROENTEROLOGY | Facility: EXTERNAL LOCATION | Age: 55
End: 2025-05-07
Payer: COMMERCIAL

## 2025-05-09 LAB
ALBUMIN: 4.3 G/DL (ref 3.4–5)
ALPHA 1 GLOBULIN: 0.3 G/DL (ref 0.2–0.6)
ALPHA 2 GLOBULIN: 1 G/DL (ref 0.4–1.1)
BETA GLOBULIN: 0.8 G/DL (ref 0.5–1.2)
GAMMA GLOBULIN: 0.8 G/DL (ref 0.5–1.4)
IMMUNOFIXATION COMMENT: NORMAL
PATH REVIEW - SERUM IMMUNOFIXATION: NORMAL
PATH REVIEW-SERUM PROTEIN ELECTROPHORESIS: NORMAL
PROTEIN ELECTROPHORESIS COMMENT: NORMAL

## 2025-05-22 ENCOUNTER — APPOINTMENT (OUTPATIENT)
Dept: GASTROENTEROLOGY | Facility: EXTERNAL LOCATION | Age: 55
End: 2025-05-22
Payer: COMMERCIAL

## 2025-05-22 VITALS
DIASTOLIC BLOOD PRESSURE: 75 MMHG | TEMPERATURE: 97.7 F | RESPIRATION RATE: 15 BRPM | HEART RATE: 71 BPM | OXYGEN SATURATION: 99 % | SYSTOLIC BLOOD PRESSURE: 106 MMHG

## 2025-05-22 DIAGNOSIS — Z12.11 SCREEN FOR COLON CANCER: ICD-10-CM

## 2025-05-22 PROCEDURE — 45385 COLONOSCOPY W/LESION REMOVAL: CPT | Performed by: INTERNAL MEDICINE

## 2025-05-22 RX ORDER — LIDOCAINE HYDROCHLORIDE 20 MG/ML
INJECTION, SOLUTION INFILTRATION; PERINEURAL AS NEEDED
Status: DISCONTINUED | OUTPATIENT
Start: 2025-05-22 | End: 2025-05-22

## 2025-05-22 RX ORDER — PROPOFOL 10 MG/ML
INJECTION, EMULSION INTRAVENOUS AS NEEDED
Status: DISCONTINUED | OUTPATIENT
Start: 2025-05-22 | End: 2025-05-22

## 2025-05-22 RX ORDER — SODIUM CHLORIDE 9 MG/ML
INJECTION, SOLUTION INTRAVENOUS CONTINUOUS PRN
Status: DISCONTINUED | OUTPATIENT
Start: 2025-05-22 | End: 2025-05-22

## 2025-05-22 RX ORDER — ONDANSETRON HYDROCHLORIDE 2 MG/ML
4 INJECTION, SOLUTION INTRAVENOUS ONCE AS NEEDED
Status: DISCONTINUED | OUTPATIENT
Start: 2025-05-22 | End: 2025-05-23 | Stop reason: HOSPADM

## 2025-05-22 RX ORDER — TIRZEPATIDE 12.5 MG/.5ML
12.5 INJECTION, SOLUTION SUBCUTANEOUS
COMMUNITY

## 2025-05-22 RX ADMIN — PROPOFOL 50 MG: 10 INJECTION, EMULSION INTRAVENOUS at 09:13

## 2025-05-22 RX ADMIN — PROPOFOL 50 MG: 10 INJECTION, EMULSION INTRAVENOUS at 09:07

## 2025-05-22 RX ADMIN — PROPOFOL 50 MG: 10 INJECTION, EMULSION INTRAVENOUS at 09:08

## 2025-05-22 RX ADMIN — PROPOFOL 50 MG: 10 INJECTION, EMULSION INTRAVENOUS at 09:20

## 2025-05-22 RX ADMIN — PROPOFOL 100 MG: 10 INJECTION, EMULSION INTRAVENOUS at 09:03

## 2025-05-22 RX ADMIN — SODIUM CHLORIDE: 9 INJECTION, SOLUTION INTRAVENOUS at 09:04

## 2025-05-22 RX ADMIN — LIDOCAINE HYDROCHLORIDE 30 MG: 20 INJECTION, SOLUTION INFILTRATION; PERINEURAL at 09:03

## 2025-05-22 SDOH — HEALTH STABILITY: MENTAL HEALTH: CURRENT SMOKER: 0

## 2025-05-22 ASSESSMENT — PAIN SCALES - GENERAL
PAIN_LEVEL: 0
PAINLEVEL_OUTOF10: 0 - NO PAIN

## 2025-05-22 ASSESSMENT — COLUMBIA-SUICIDE SEVERITY RATING SCALE - C-SSRS
2. HAVE YOU ACTUALLY HAD ANY THOUGHTS OF KILLING YOURSELF?: NO
1. IN THE PAST MONTH, HAVE YOU WISHED YOU WERE DEAD OR WISHED YOU COULD GO TO SLEEP AND NOT WAKE UP?: NO
6. HAVE YOU EVER DONE ANYTHING, STARTED TO DO ANYTHING, OR PREPARED TO DO ANYTHING TO END YOUR LIFE?: NO

## 2025-05-22 ASSESSMENT — PAIN - FUNCTIONAL ASSESSMENT
PAIN_FUNCTIONAL_ASSESSMENT: 0-10

## 2025-05-22 NOTE — H&P
Outpatient Hospital Procedure H&P    Patient Profile-Procedures  Initial Info  Patient Demographics  Name Alejandra Rascon  Date of Birth 1970  MRN [id number]  Address   [address] [address]  [facility] [address] [address]  [facility] [address]    Primary Phone Number [phone]  Secondary Phone Number    PCP Sue Moyer    Procedure(s):  Colonoscopy   Primary contact name and number   Extended Emergency Contact Information  Primary Emergency Contact: SERAFIN GRIFFITH  Home Phone: [phone]  Relation: Spouse  Secondary Emergency Contact: RAYRAY CAMPOS  Home Phone: [phone]  Relation: Other    General Health  Weight There were no vitals filed for this visit.  BMI There is no height or weight on file to calculate BMI.    Allergies  RX Allergies[1]    Past Medical History   Medical History[2]    Provider assessment  Diagnosis: h/o polyps  Medication Reviewed - yes  Prior to Admission medications    Medication Sig Start Date End Date Taking? Authorizing Provider   amLODIPine (Norvasc) 5 mg tablet Take 1 tablet (5 mg) by mouth once daily. 9/25/24  Yes Sue Moyer MD   CeleBREX 100 mg capsule Take by mouth. 1/24/14  Yes Historical Provider, MD   meclizine (Antivert) 25 mg tablet Take by mouth. 5/18/22  Yes Historical Provider, MD   multivitamin tablet Take by mouth. 5/18/22  Yes Historical Provider, MD   pantoprazole (ProtoNix) 40 mg EC tablet Take 1 tablet (40 mg) by mouth once daily. Do not crush, chew, or split. 12/10/24 6/8/25 Yes Sue Moyer MD   triamcinolone (Kenalog) 0.025 % ointment Triamcinolone Acetonide 0.025 % External Ointment  Refills: 0  Start: 7-May-2020 5/7/20  Yes Historical Provider, MD   sod sulf-pot chloride-mag sulf (Sutab) 1.479-0.188- 0.225 gram tablet tablet Take 12 tablets by mouth 2 times a day. 4/8/25   Caesar Calloway MD   tirzepatide, weight loss, (Zepbound) 12.5 mg/0.5 mL injection Inject 12.5 mg under the skin every 7 days.    Historical Provider, MD   sod sulf-pot chloride-mag sulf (Sutab)  1.479-0.188- 0.225 gram tablet tablet Take 12 tablets by mouth 2 times a day. 3/26/25 5/22/25  Caesar Calloway MD       Physical Exam  Vitals:    05/22/25 0836   BP: 132/76   Pulse: 79   Resp: 18   Temp: 36.5 °C (97.7 °F)        General: A&Ox3, NAD.  CV: RRR. No murmur.  Resp: CTA bilaterally. No wheezing, rhonchi or rales.   Extrem: No edema.       Oropharyngeal Classification II (hard and soft palate, upper portion of tonsils and uvula visible)  ASA PS Classification 2  Sedation Plan Deep  Procedure Plan - pre-procedural (re)assesment completed by physician:  discharge/transfer patient when discharge criteria met    John Beckman MD  5/22/2025 8:54 AM           [1]   Allergies  Allergen Reactions    Hydrocodone Rash     states OK to take tylenol   [2]   Past Medical History:  Diagnosis Date    Abdominal obesity 10/21/2024    Abdominal obesity and metabolic syndrome 10/07/2023    Anesthesia of skin 03/10/2017    Numbness of lip    Body mass index (BMI)40.0-44.9, adult 05/26/2021    BMI 40.0-44.9, adult    Cervical adenopathy 10/07/2023    Elevated blood-pressure reading, without diagnosis of hypertension 03/10/2017    Elevated BP without diagnosis of hypertension    Encounter for gynecological examination (general) (routine) without abnormal findings 08/29/2017    Well woman exam    Encounter for immunization 04/23/2018    Need for Tdap vaccination    Encounter for other screening for malignant neoplasm of breast 06/15/2018    Screening for breast cancer    Encounter for screening for infections with a predominantly sexual mode of transmission 06/30/2016    Screening for STD (sexually transmitted disease)    Encounter for screening for malignant neoplasm of cervix 08/29/2017    Screening for cervical cancer    Exposure to severe acute respiratory syndrome coronavirus 2 (SARS-CoV-2) 10/21/2024    GERD (gastroesophageal reflux disease)     Health maintenance examination 06/14/2023    Hx antineoplastic chemo Sept  2023-Jan 2024    Hypertension     Injury caused by twisting with repetitive movement 02/13/2019    Localized enlarged lymph nodes 04/30/2015    Enlarged lymph nodes in armpit    Localized enlarged lymph nodes 08/22/2014    Cervical adenopathy    Lymphadenopathy, inguinal 10/07/2023    Morbid (severe) obesity due to excess calories (Multi) 06/07/2022    Class 2 severe obesity with serious comorbidity and body mass index (BMI) of 39.0 to 39.9 in adult    Morbid obesity (Multi) 10/21/2024    Nodular lymphocyte predominant Hodgkin lymphoma     Other specified health status 11/13/2014    Contraception    Personal history of other diseases of the musculoskeletal system and connective tissue 04/11/2014    History of low back pain    Personal history of other specified conditions 02/14/2019    History of palpitations    Personal history of other specified conditions 02/11/2019    History of paresthesia    Personal history of other specified conditions 08/22/2014    History of headache    Polyphagia 10/07/2023    Post-menopausal bleeding 06/14/2023    Post-traumatic headache 10/07/2023    Comment on above: Added by Problem List Migration; 2013-2-19; Moved to [facility] Nov 24 2013 4:45PM;      Postconcussion syndrome 10/07/2023    Retroperitoneal lymphadenopathy 10/07/2023

## 2025-05-22 NOTE — ANESTHESIA POSTPROCEDURE EVALUATION
Patient: Aeljandra Rascon    Procedure Summary       Date: 05/22/25 Room / Location: [facility]    Anesthesia Start: 0901 Anesthesia Stop:     Procedure: COLONOSCOPY Diagnosis: Screen for colon cancer    Scheduled Providers: John Beckman MD; ROBIN Dacosta; Nidhi Gomez RN Responsible Provider: ROBIN Dacosta    Anesthesia Type: MAC ASA Status: 3            Anesthesia Type: MAC    Vitals Value Taken Time   BP 96/65 05/22/25 09:24   Temp 36.5 05/22/25 09:24   Pulse 97 05/22/25 09:24   Resp 16 05/22/25 09:24   SpO2 100 05/22/25 09:24       Anesthesia Post Evaluation    Patient location during evaluation: bedside  Patient participation: complete - patient participated  Level of consciousness: awake  Pain score: 0  Pain management: adequate  Airway patency: patent  Cardiovascular status: acceptable  Respiratory status: acceptable and room air  Hydration status: acceptable  Postoperative Nausea and Vomiting: none        No notable events documented.

## 2025-05-22 NOTE — ANESTHESIA PREPROCEDURE EVALUATION
Patient: Alejandra Rascon    Procedure Information       Date/Time: 25 0900    Scheduled providers: John Beckman MD; EVANGELIST Dacosta-CRNA; Nidhi Gomez RN    Procedure: COLONOSCOPY    Location: [facility]            Relevant Problems   Cardiac   (+) Primary hypertension      Neuro   (+) Anxiety   (+) Carpal tunnel syndrome of right wrist   (+) Carpal tunnel syndrome, bilateral   (+) Cervical radiculopathy   (+) Low back pain with sciatica   (+) Lumbar radiculitis   (+) Mixed anxiety depressive disorder      GI   (+) Gastroesophageal reflux disease      Hematology   (+) Nodular lymphocyte predominant Hodgkin lymphoma      Musculoskeletal   (+) Carpal tunnel syndrome of right wrist   (+) Carpal tunnel syndrome, bilateral   (+) Degenerative disc disease at L5-S1 level   (+) Herniated lumbar intervertebral disc       Clinical information reviewed:   Tobacco  Allergies  Meds   Med Hx  Surg Hx  OB Status  Fam Hx  Soc   Hx        NPO Detail:  NPO/Void Status  Carbohydrate Drink Given Prior to Surgery? : N  Date of Last Liquid: 25  Time of Last Liquid: 730  Date of Last Solid: 25  Time of Last Solid:   Last Intake Type: Clear fluids  Time of Last Void: 08         Physical Exam    Airway  Mallampati: II  TM distance: >3 FB  Neck ROM: full  Mouth openin finger widths     Cardiovascular   Rhythm: regular  Rate: normal     Dental - normal exam     Pulmonary Breath sounds clear to auscultation     Abdominal            Anesthesia Plan    History of general anesthesia?: yes  History of complications of general anesthesia?: no    ASA 3     MAC   (Preoxygenated 2L prior to procedure.  Patient positioned self to comfort prior to sedation administered; eyes closed; continuous monitoring.)  The patient is not a current smoker.    intravenous induction   Anesthetic plan and risks discussed with patient.    Plan discussed with CRNA.

## 2025-05-22 NOTE — DISCHARGE INSTRUCTIONS

## 2025-06-06 DIAGNOSIS — K21.9 GASTROESOPHAGEAL REFLUX DISEASE WITHOUT ESOPHAGITIS: ICD-10-CM

## 2025-06-06 RX ORDER — PANTOPRAZOLE SODIUM 40 MG/1
40 TABLET, DELAYED RELEASE ORAL DAILY
Qty: 30 TABLET | Refills: 0 | Status: SHIPPED | OUTPATIENT
Start: 2025-06-06

## 2025-06-08 LAB
LABORATORY COMMENT REPORT: NORMAL
PATH REPORT.FINAL DX SPEC: NORMAL
PATH REPORT.GROSS SPEC: NORMAL
PATH REPORT.TOTAL CANCER: NORMAL

## 2025-06-18 ENCOUNTER — APPOINTMENT (OUTPATIENT)
Dept: ORTHOPEDIC SURGERY | Facility: CLINIC | Age: 55
End: 2025-06-18
Payer: COMMERCIAL

## 2025-06-18 DIAGNOSIS — Z98.890 STATUS POST ROTATOR CUFF REPAIR: Primary | ICD-10-CM

## 2025-06-18 PROCEDURE — 99213 OFFICE O/P EST LOW 20 MIN: CPT | Performed by: STUDENT IN AN ORGANIZED HEALTH CARE EDUCATION/TRAINING PROGRAM

## 2025-06-30 NOTE — PROGRESS NOTES
PRIMARY CARE PHYSICIAN: Sue Moyer MD    ORTHOPAEDIC POSTOPERATIVE VISIT    ASSESSMENT & PLAN    Impression: 55 y.o. female 16 weeks postop s/p left shoulder arthroscopic rotator cuff repair, subacromial decompression, biceps tenodesis, overall recovering well.  Surgery was performed on 2/24/2025.    Plan:   At this time, she will continue working on strengthening exercises.    I reviewed the intraoperative findings with the patient and answered all their questions. I reviewed their postoperative timeline and plan with them. They understand the postoperative precautions and the treatment plan going forward.     Follow-Up: Patient will follow-up 12 weeks.    At the end of the visit, all questions were answered in full. The patient is in agreement with the plan and recommendations. They will call the office with any questions/concerns.      Note dictated with [employer] software. Completed without full typed error editing and sent to avoid delay.      SUBJECTIVE  CHIEF COMPLAINT:   Chief Complaint   Patient presents with    Left Shoulder - Follow-up, Post-op        HPI: Alejandra Rascon is a 55 y.o. patient. Alejandra Rascon is now postop status post left shoulder arthroscopic rotator cuff repair, subacromial decompression, and biceps tenodesis.  Overall, the patient is recovering very well.  She has no pain at this time and she is no longer on any narcotic or over-the-counter anti-inflammatory medication.  She continues with physical therapy as per protocol.  She has successfully weaned out of her sling.  There are no reports of distal numbness or tingling.  She has no issues with her surgical incisions.  No acute complaints today.    REVIEW OF SYSTEMS  Constitutional: See HPI for pain assessment, No significant weight loss, recent trauma  Cardiovascular: No chest pain, shortness of breath  Respiratory: No difficulty breathing, cough  Gastrointestinal: No nausea, vomiting, diarrhea,  "constipation  Musculoskeletal: Noted in HPI, positive for pain, restricted motion, stiffness  Integumentary: No rashes, easy bruising, redness   Neurological: no numbness or tingling in extremities, no gait disturbances   Psychiatric: No mood changes, memory changes, social issues  Heme/Lymph: no excessive swelling, easy bruising, excessive bleeding  ENT: No hearing changes  Eyes: No vision changes    CURRENT MEDICATIONS:   Current Medications[1]     OBJECTIVE    PHYSICAL EXAM      1/21/2025    12:30 PM 1/31/2025     9:14 AM 5/1/2025     4:38 PM 5/22/2025     8:36 AM 5/22/2025     9:26 AM 5/22/2025     9:36 AM 5/22/2025     9:45 AM   Vitals   Systolic 121  123 132 96 106 106   Diastolic 85  79 76 65 73 75   BP Location   Right arm       Heart Rate 118  83 79 85 83 71   Temp 36 °C (96.8 °F)  36.3 °C (97.3 °F) 36.5 °C (97.7 °F) 36.5 °C (97.7 °F)     Resp 18  18 18 16 16 15   Height  1.676 m (5' 6\")        Weight (lb) 167.99 166 165.7       BMI 27.11 kg/m2 26.79 kg/m2 26.74 kg/m2       BSA (m2) 1.88 m2 1.87 m2 1.87 m2       Visit Report Report Report Report          There is no height or weight on file to calculate BMI.    General: Well-appearing, no acute distress    Skin intact bilateral upper and lower extremities  No erythema  No warmth    Detailed examination of the left shoulder demonstrates:  Surgical incision(s) healing well.  No erythema or warmth  No drainage  No ecchymosis  Resolving swelling, minimal  Biceps contour normal  Shoulder range of motion: Forward flexion 140, abduction 130, external rotation 30 degrees.  Strength: 4+/5 supraspinatus and infraspinatus, 5/5 subscapularis.  Upper extremity motor grossly intact  C5-T1 sensation intact bilaterally  2+ radial pulses bilaterally  Warm and well-perfused, brisk capillary refill    IMAGING:   No new imaging    Bret Galindo MD, MPH  Attending Surgeon    Sports Medicine Orthopaedic Surgery  [facility] " Medicine [place]  [employer] [employer]        [1]   Current Outpatient Medications   Medication Sig Dispense Refill    amLODIPine (Norvasc) 5 mg tablet Take 1 tablet (5 mg) by mouth once daily. 90 tablet 3    CeleBREX 100 mg capsule Take by mouth.      meclizine (Antivert) 25 mg tablet Take by mouth.      multivitamin tablet Take by mouth.      pantoprazole (ProtoNix) 40 mg EC tablet TAKE ONE TABLET BY MOUTH EVERY DAY. DO NOT CRUSH, CHEW, OR SPLIT. 30 tablet 0    sod sulf-pot chloride-mag sulf (Sutab) 1.479-0.188- 0.225 gram tablet tablet Take 12 tablets by mouth 2 times a day. 24 tablet 0    tirzepatide, weight loss, (Zepbound) 12.5 mg/0.5 mL injection Inject 12.5 mg under the skin every 7 days.      triamcinolone (Kenalog) 0.025 % ointment Triamcinolone Acetonide 0.025 % External Ointment  Refills: 0  Start: 7-May-2020       No current facility-administered medications for this visit.

## 2025-07-08 DIAGNOSIS — K21.9 GASTROESOPHAGEAL REFLUX DISEASE WITHOUT ESOPHAGITIS: ICD-10-CM

## 2025-07-08 RX ORDER — PANTOPRAZOLE SODIUM 40 MG/1
40 TABLET, DELAYED RELEASE ORAL DAILY
Qty: 30 TABLET | Refills: 0 | Status: SHIPPED | OUTPATIENT
Start: 2025-07-08

## 2025-07-18 ENCOUNTER — APPOINTMENT (OUTPATIENT)
Dept: ORTHOPEDIC SURGERY | Facility: CLINIC | Age: 55
End: 2025-07-18
Payer: COMMERCIAL

## 2025-08-07 ENCOUNTER — APPOINTMENT (OUTPATIENT)
Dept: HEMATOLOGY/ONCOLOGY | Facility: HOSPITAL | Age: 55
End: 2025-08-07
Payer: COMMERCIAL

## 2025-08-21 ENCOUNTER — HOSPITAL ENCOUNTER (OUTPATIENT)
Dept: RADIOLOGY | Facility: CLINIC | Age: 55
Discharge: HOME | End: 2025-08-21
Payer: COMMERCIAL

## 2025-08-21 VITALS — WEIGHT: 165.79 LBS | BODY MASS INDEX: 26.64 KG/M2 | HEIGHT: 66 IN

## 2025-08-21 DIAGNOSIS — K21.9 GASTROESOPHAGEAL REFLUX DISEASE WITHOUT ESOPHAGITIS: ICD-10-CM

## 2025-08-21 DIAGNOSIS — Z12.31 SCREENING MAMMOGRAM FOR BREAST CANCER: ICD-10-CM

## 2025-08-21 DIAGNOSIS — I10 HTN (HYPERTENSION), BENIGN: ICD-10-CM

## 2025-08-21 PROCEDURE — 77063 BREAST TOMOSYNTHESIS BI: CPT

## 2025-08-21 RX ORDER — PANTOPRAZOLE SODIUM 40 MG/1
40 TABLET, DELAYED RELEASE ORAL DAILY
Qty: 30 TABLET | Refills: 0 | Status: SHIPPED | OUTPATIENT
Start: 2025-08-21

## 2025-08-21 RX ORDER — AMLODIPINE BESYLATE 5 MG/1
5 TABLET ORAL DAILY
Qty: 90 TABLET | Refills: 3 | Status: SHIPPED | OUTPATIENT
Start: 2025-08-21

## 2025-08-28 ENCOUNTER — OFFICE VISIT (OUTPATIENT)
Dept: HEMATOLOGY/ONCOLOGY | Facility: HOSPITAL | Age: 55
End: 2025-08-28
Payer: COMMERCIAL

## 2025-08-28 ENCOUNTER — LAB (OUTPATIENT)
Dept: LAB | Facility: HOSPITAL | Age: 55
End: 2025-08-28
Payer: COMMERCIAL

## 2025-08-28 VITALS
HEART RATE: 74 BPM | DIASTOLIC BLOOD PRESSURE: 85 MMHG | RESPIRATION RATE: 15 BRPM | WEIGHT: 160.9 LBS | TEMPERATURE: 97.3 F | BODY MASS INDEX: 25.98 KG/M2 | SYSTOLIC BLOOD PRESSURE: 116 MMHG | OXYGEN SATURATION: 100 %

## 2025-08-28 DIAGNOSIS — C81.00 NODULAR LYMPHOCYTE PREDOMINANT HODGKIN LYMPHOMA, UNSPECIFIED BODY REGION (MULTI): ICD-10-CM

## 2025-08-28 PROCEDURE — 3074F SYST BP LT 130 MM HG: CPT | Performed by: INTERNAL MEDICINE

## 2025-08-28 PROCEDURE — 99215 OFFICE O/P EST HI 40 MIN: CPT | Performed by: INTERNAL MEDICINE

## 2025-08-28 PROCEDURE — 3079F DIAST BP 80-89 MM HG: CPT | Performed by: INTERNAL MEDICINE

## 2025-08-28 ASSESSMENT — PAIN SCALES - GENERAL: PAINLEVEL_OUTOF10: 0-NO PAIN

## 2025-10-14 ENCOUNTER — APPOINTMENT (OUTPATIENT)
Dept: PRIMARY CARE | Facility: CLINIC | Age: 55
End: 2025-10-14
Payer: COMMERCIAL